# Patient Record
Sex: FEMALE | Race: WHITE | NOT HISPANIC OR LATINO | Employment: UNEMPLOYED | RURAL
[De-identification: names, ages, dates, MRNs, and addresses within clinical notes are randomized per-mention and may not be internally consistent; named-entity substitution may affect disease eponyms.]

---

## 2020-03-10 ENCOUNTER — HISTORICAL (OUTPATIENT)
Dept: ADMINISTRATIVE | Facility: HOSPITAL | Age: 25
End: 2020-03-10

## 2020-03-10 LAB — HCG SERPL-ACNC: <1 MIU/ML

## 2020-08-03 ENCOUNTER — HISTORICAL (OUTPATIENT)
Dept: ADMINISTRATIVE | Facility: HOSPITAL | Age: 25
End: 2020-08-03

## 2020-08-03 LAB — SARS-COV+SARS-COV-2 AG RESP QL IA.RAPID: POSITIVE

## 2020-12-15 ENCOUNTER — HISTORICAL (OUTPATIENT)
Dept: ADMINISTRATIVE | Facility: HOSPITAL | Age: 25
End: 2020-12-15

## 2021-02-17 ENCOUNTER — HISTORICAL (OUTPATIENT)
Dept: ADMINISTRATIVE | Facility: HOSPITAL | Age: 26
End: 2021-02-17

## 2021-02-17 LAB
ABO: NORMAL
ANTIBODY SCREEN: NEGATIVE
RH TYPE: NEGATIVE
UNIT STATUS: 1

## 2021-02-18 ENCOUNTER — HISTORICAL (OUTPATIENT)
Dept: ADMINISTRATIVE | Facility: HOSPITAL | Age: 26
End: 2021-02-18

## 2021-02-18 LAB
ALBUMIN SERPL BCP-MCNC: 2.9 G/DL (ref 3.5–5)
ALBUMIN/GLOB SERPL: 0.7 {RATIO}
ALP SERPL-CCNC: 95 U/L (ref 37–98)
ALT SERPL W P-5'-P-CCNC: 20 U/L (ref 13–56)
ANION GAP SERPL CALCULATED.3IONS-SCNC: 12 MMOL/L
AST SERPL W P-5'-P-CCNC: 19 U/L (ref 15–37)
BASOPHILS # BLD AUTO: 0.03 X10E3/UL (ref 0–0.2)
BASOPHILS NFR BLD AUTO: 0.2 % (ref 0–1)
BILIRUB SERPL-MCNC: 0.8 MG/DL (ref 0–1.2)
BILIRUB UR QL STRIP: NEGATIVE MG/DL
BUN SERPL-MCNC: 8 MG/DL (ref 7–18)
BUN/CREAT SERPL: 19.5
CALCIUM SERPL-MCNC: 8.6 MG/DL (ref 8.5–10.1)
CHLORIDE SERPL-SCNC: 104 MMOL/L (ref 98–107)
CLARITY UR: ABNORMAL
CO2 SERPL-SCNC: 24 MMOL/L (ref 21–32)
COLOR UR: ABNORMAL
CREAT SERPL-MCNC: 0.41 MG/DL (ref 0.55–1.02)
EOSINOPHIL # BLD AUTO: 0.01 X10E3/UL (ref 0–0.5)
EOSINOPHIL NFR BLD AUTO: 0.1 % (ref 1–4)
EOSINOPHIL NFR BLD MANUAL: 1 % (ref 1–4)
ERYTHROCYTE [DISTWIDTH] IN BLOOD BY AUTOMATED COUNT: 12.5 % (ref 11.5–14.5)
GLOBULIN SER-MCNC: 4.1 G/DL (ref 2–4)
GLUCOSE SERPL-MCNC: 82 MG/DL (ref 74–106)
GLUCOSE UR STRIP-MCNC: NEGATIVE MG/DL
HCT VFR BLD AUTO: 33.9 % (ref 38–47)
HGB BLD-MCNC: 10.8 G/DL (ref 12–16)
IMM GRANULOCYTES # BLD AUTO: 0.08 X10E3/UL (ref 0–0.04)
IMM GRANULOCYTES NFR BLD: 0.5 % (ref 0–0.4)
KETONES UR STRIP-SCNC: >=80 MG/DL
LEUKOCYTE ESTERASE UR QL STRIP: NEGATIVE LEU/UL
LYMPHOCYTES # BLD AUTO: 0.61 X10E3/UL (ref 1–4.8)
LYMPHOCYTES NFR BLD AUTO: 4.2 % (ref 27–41)
LYMPHOCYTES NFR BLD MANUAL: 3 % (ref 27–41)
MCH RBC QN AUTO: 29.3 PG (ref 27–31)
MCHC RBC AUTO-ENTMCNC: 31.9 G/DL (ref 32–36)
MCV RBC AUTO: 92.1 FL (ref 80–96)
MONOCYTES # BLD AUTO: 0.34 X10E3/UL (ref 0–0.8)
MONOCYTES NFR BLD AUTO: 2.3 % (ref 2–6)
MONOCYTES NFR BLD MANUAL: 1 % (ref 2–6)
MPC BLD CALC-MCNC: 12.3 FL (ref 9.4–12.4)
NEUTROPHILS # BLD AUTO: 13.49 X10E3/UL (ref 1.8–7.7)
NEUTROPHILS NFR BLD AUTO: 92.7 % (ref 53–65)
NEUTS BAND NFR BLD MANUAL: 4 % (ref 1–5)
NEUTS SEG NFR BLD MANUAL: 91 % (ref 50–62)
NITRITE UR QL STRIP: NEGATIVE
NRBC # BLD AUTO: 0 X10E3/UL (ref 0–0)
NRBC, AUTO (.00): 0 /100 (ref 0–0)
PH UR STRIP: 6.5 PH UNITS (ref 5–8)
PLATELET # BLD AUTO: 180 X10E3/UL (ref 150–400)
PLATELET MORPHOLOGY: ABNORMAL
POLYCHROMASIA BLD QL SMEAR: ABNORMAL
POTASSIUM SERPL-SCNC: 4.3 MMOL/L (ref 3.5–5.1)
PROT SERPL-MCNC: 7 G/DL (ref 6.4–8.2)
PROT UR QL STRIP: NEGATIVE MG/DL
RBC # BLD AUTO: 3.68 X10E6/UL (ref 4.2–5.4)
RBC # UR STRIP: NEGATIVE ERY/UL
SODIUM SERPL-SCNC: 136 MMOL/L (ref 136–145)
SP GR UR STRIP: 1.02 (ref 1–1.03)
UROBILINOGEN UR STRIP-ACNC: 0.2 EU/DL
WBC # BLD AUTO: 14.56 X10E3/UL (ref 4.5–11)

## 2021-03-16 ENCOUNTER — HOSPITAL ENCOUNTER (OUTPATIENT)
Facility: HOSPITAL | Age: 26
Discharge: HOME | End: 2021-03-16
Attending: OBSTETRICS & GYNECOLOGY | Admitting: OBSTETRICS & GYNECOLOGY
Payer: MEDICAID

## 2021-03-16 VITALS
HEART RATE: 102 BPM | HEIGHT: 63 IN | WEIGHT: 148.63 LBS | TEMPERATURE: 99 F | DIASTOLIC BLOOD PRESSURE: 53 MMHG | RESPIRATION RATE: 18 BRPM | OXYGEN SATURATION: 98 % | SYSTOLIC BLOOD PRESSURE: 102 MMHG | BODY MASS INDEX: 26.34 KG/M2

## 2021-03-16 DIAGNOSIS — Z34.90 PREGNANT AND NOT YET DELIVERED: ICD-10-CM

## 2021-03-16 PROCEDURE — 81001 URINALYSIS AUTO W/SCOPE: CPT

## 2021-03-16 PROCEDURE — 63600175 PHARM REV CODE 636 W HCPCS: Performed by: OBSTETRICS & GYNECOLOGY

## 2021-03-16 PROCEDURE — 99211 OFF/OP EST MAY X REQ PHY/QHP: CPT

## 2021-03-16 RX ORDER — ONDANSETRON 2 MG/ML
4 INJECTION INTRAMUSCULAR; INTRAVENOUS EVERY 6 HOURS PRN
Status: DISCONTINUED | OUTPATIENT
Start: 2021-03-16 | End: 2021-03-16 | Stop reason: HOSPADM

## 2021-03-16 RX ADMIN — SODIUM CHLORIDE, POTASSIUM CHLORIDE, SODIUM LACTATE AND CALCIUM CHLORIDE 1000 ML: 600; 310; 30; 20 INJECTION, SOLUTION INTRAVENOUS at 02:03

## 2021-04-27 ENCOUNTER — HOSPITAL ENCOUNTER (OUTPATIENT)
Facility: HOSPITAL | Age: 26
Discharge: HOME | End: 2021-04-27
Attending: OBSTETRICS & GYNECOLOGY | Admitting: OBSTETRICS & GYNECOLOGY
Payer: MEDICAID

## 2021-04-27 VITALS
BODY MASS INDEX: 27.89 KG/M2 | SYSTOLIC BLOOD PRESSURE: 101 MMHG | WEIGHT: 157.38 LBS | HEIGHT: 63 IN | RESPIRATION RATE: 20 BRPM | DIASTOLIC BLOOD PRESSURE: 55 MMHG | TEMPERATURE: 99 F | HEART RATE: 108 BPM

## 2021-04-27 DIAGNOSIS — Z34.90 PREGNANT AND NOT YET DELIVERED: ICD-10-CM

## 2021-04-27 LAB
BILIRUB UR QL STRIP: NEGATIVE
CLARITY UR: ABNORMAL
COLOR UR: YELLOW
GLUCOSE UR STRIP-MCNC: NEGATIVE MG/DL
KETONES UR STRIP-SCNC: NEGATIVE MG/DL
LEUKOCYTE ESTERASE UR QL STRIP: NEGATIVE
NITRITE UR QL STRIP: NEGATIVE
PH UR STRIP: 6.5 PH UNITS
PROT UR QL STRIP: ABNORMAL
RBC # UR STRIP: NEGATIVE /UL
SP GR UR STRIP: >=1.03
UROBILINOGEN UR STRIP-ACNC: 2 MG/DL

## 2021-04-27 PROCEDURE — 99211 OFF/OP EST MAY X REQ PHY/QHP: CPT

## 2021-04-27 PROCEDURE — 81003 URINALYSIS AUTO W/O SCOPE: CPT | Performed by: ADVANCED PRACTICE MIDWIFE

## 2021-05-03 ENCOUNTER — HOSPITAL ENCOUNTER (INPATIENT)
Facility: HOSPITAL | Age: 26
LOS: 2 days | Discharge: HOME | End: 2021-05-05
Attending: OBSTETRICS & GYNECOLOGY | Admitting: OBSTETRICS & GYNECOLOGY
Payer: MEDICAID

## 2021-05-03 ENCOUNTER — ANESTHESIA (OUTPATIENT)
Dept: OBSTETRICS AND GYNECOLOGY | Facility: HOSPITAL | Age: 26
End: 2021-05-03
Payer: MEDICAID

## 2021-05-03 ENCOUNTER — ANESTHESIA EVENT (OUTPATIENT)
Dept: OBSTETRICS AND GYNECOLOGY | Facility: HOSPITAL | Age: 26
End: 2021-05-03
Payer: MEDICAID

## 2021-05-03 DIAGNOSIS — Z34.90 PREGNANCY: ICD-10-CM

## 2021-05-03 DIAGNOSIS — Z34.90 ENCOUNTER FOR ELECTIVE INDUCTION OF LABOR: ICD-10-CM

## 2021-05-03 PROBLEM — O26.899 RH NEGATIVE STATE IN ANTEPARTUM PERIOD: Status: ACTIVE | Noted: 2021-05-03

## 2021-05-03 PROBLEM — Z67.91 RH NEGATIVE STATE IN ANTEPARTUM PERIOD: Status: ACTIVE | Noted: 2021-05-03

## 2021-05-03 PROBLEM — Z3A.39 39 WEEKS GESTATION OF PREGNANCY: Status: ACTIVE | Noted: 2021-05-03

## 2021-05-03 LAB
ALBUMIN SERPL BCP-MCNC: 2.5 G/DL (ref 3.5–5)
ALBUMIN/GLOB SERPL: 0.6 {RATIO}
ALP SERPL-CCNC: 158 U/L (ref 37–98)
ALT SERPL W P-5'-P-CCNC: 21 U/L (ref 13–56)
ANION GAP SERPL CALCULATED.3IONS-SCNC: 15 MMOL/L (ref 7–16)
ANTIBODY IDENTIFICATION: NORMAL
AST SERPL W P-5'-P-CCNC: 20 U/L (ref 15–37)
BASOPHILS # BLD AUTO: 0.02 K/UL (ref 0–0.2)
BASOPHILS NFR BLD AUTO: 0.2 % (ref 0–1)
BILIRUB SERPL-MCNC: 0.3 MG/DL (ref 0–1.2)
BILIRUB UR QL STRIP: NEGATIVE
BUN SERPL-MCNC: 9 MG/DL (ref 7–18)
BUN/CREAT SERPL: 18 (ref 6–20)
CALCIUM SERPL-MCNC: 8.5 MG/DL (ref 8.5–10.1)
CHLORIDE SERPL-SCNC: 105 MMOL/L (ref 98–107)
CLARITY UR: ABNORMAL
CO2 SERPL-SCNC: 23 MMOL/L (ref 21–32)
COLOR UR: YELLOW
CREAT SERPL-MCNC: 0.5 MG/DL (ref 0.55–1.02)
DIFFERENTIAL METHOD BLD: ABNORMAL
EOSINOPHIL # BLD AUTO: 0.05 K/UL (ref 0–0.5)
EOSINOPHIL NFR BLD AUTO: 0.5 % (ref 1–4)
ERYTHROCYTE [DISTWIDTH] IN BLOOD BY AUTOMATED COUNT: 13.2 % (ref 11.5–14.5)
GLOBULIN SER-MCNC: 4 G/DL (ref 2–4)
GLUCOSE SERPL-MCNC: 105 MG/DL (ref 74–106)
GLUCOSE UR STRIP-MCNC: NEGATIVE MG/DL
HBV SURFACE AG SERPL QL IA: NORMAL
HCT VFR BLD AUTO: 29.2 % (ref 38–47)
HGB BLD-MCNC: 9.2 G/DL (ref 12–16)
HIV 1+O+2 AB SERPL QL: NORMAL
IMM GRANULOCYTES # BLD AUTO: 0.13 K/UL (ref 0–0.04)
IMM GRANULOCYTES NFR BLD: 1.3 % (ref 0–0.4)
INDIRECT COOMBS: NORMAL
KETONES UR STRIP-SCNC: ABNORMAL MG/DL
LEUKOCYTE ESTERASE UR QL STRIP: NEGATIVE
LYMPHOCYTES # BLD AUTO: 1.54 K/UL (ref 1–4.8)
LYMPHOCYTES NFR BLD AUTO: 15 % (ref 27–41)
MCH RBC QN AUTO: 27.9 PG (ref 27–31)
MCHC RBC AUTO-ENTMCNC: 31.5 G/DL (ref 32–36)
MCV RBC AUTO: 88.5 FL (ref 80–96)
MONOCYTES # BLD AUTO: 0.67 K/UL (ref 0–0.8)
MONOCYTES NFR BLD AUTO: 6.5 % (ref 2–6)
MPC BLD CALC-MCNC: 11.8 FL (ref 9.4–12.4)
NEUTROPHILS # BLD AUTO: 7.89 K/UL (ref 1.8–7.7)
NEUTROPHILS NFR BLD AUTO: 76.5 % (ref 53–65)
NITRITE UR QL STRIP: NEGATIVE
NRBC # BLD AUTO: 0 X10E3/UL
NRBC, AUTO (.00): 0 %
PH UR STRIP: 6.5 PH UNITS
PLATELET # BLD AUTO: 207 K/UL (ref 150–400)
POTASSIUM SERPL-SCNC: 3.9 MMOL/L (ref 3.5–5.1)
PROT SERPL-MCNC: 6.5 G/DL (ref 6.4–8.2)
PROT UR QL STRIP: NEGATIVE
RBC # BLD AUTO: 3.3 M/UL (ref 4.2–5.4)
RBC # UR STRIP: NEGATIVE /UL
RH BLD: NORMAL
ROSETTE - FMH (FETAL BLEED SCREEN): NORMAL
SODIUM SERPL-SCNC: 139 MMOL/L (ref 136–145)
SP GR UR STRIP: 1.02
SYPHILIS AB INTERPRETATION: NORMAL
UROBILINOGEN UR STRIP-ACNC: 4 MG/DL
WBC # BLD AUTO: 10.3 K/UL (ref 4.5–11)

## 2021-05-03 PROCEDURE — 25000003 PHARM REV CODE 250: Performed by: ANESTHESIOLOGY

## 2021-05-03 PROCEDURE — 85025 COMPLETE CBC W/AUTO DIFF WBC: CPT | Performed by: ADVANCED PRACTICE MIDWIFE

## 2021-05-03 PROCEDURE — 88307 SURGICAL PATHOLOGY: ICD-10-PCS | Mod: 26,,, | Performed by: PATHOLOGY

## 2021-05-03 PROCEDURE — 72200005 HC VAGINAL DELIVERY LEVEL II

## 2021-05-03 PROCEDURE — C1751 CATH, INF, PER/CENT/MIDLINE: HCPCS | Performed by: ANESTHESIOLOGY

## 2021-05-03 PROCEDURE — 86870 RBC ANTIBODY IDENTIFICATION: CPT | Performed by: OBSTETRICS & GYNECOLOGY

## 2021-05-03 PROCEDURE — 87340 HEPATITIS B SURFACE AG IA: CPT | Performed by: ADVANCED PRACTICE MIDWIFE

## 2021-05-03 PROCEDURE — 86780 TREPONEMA PALLIDUM: CPT | Performed by: ADVANCED PRACTICE MIDWIFE

## 2021-05-03 PROCEDURE — 81003 URINALYSIS AUTO W/O SCOPE: CPT | Performed by: ADVANCED PRACTICE MIDWIFE

## 2021-05-03 PROCEDURE — 63600175 PHARM REV CODE 636 W HCPCS: Performed by: ANESTHESIOLOGY

## 2021-05-03 PROCEDURE — 88307 TISSUE EXAM BY PATHOLOGIST: CPT | Mod: SUR | Performed by: ADVANCED PRACTICE MIDWIFE

## 2021-05-03 PROCEDURE — 72100003 HC LABOR CARE, EA. ADDL. 8 HRS

## 2021-05-03 PROCEDURE — 85461 HEMOGLOBIN FETAL: CPT | Performed by: ADVANCED PRACTICE MIDWIFE

## 2021-05-03 PROCEDURE — 36415 COLL VENOUS BLD VENIPUNCTURE: CPT | Performed by: ADVANCED PRACTICE MIDWIFE

## 2021-05-03 PROCEDURE — 72100002 HC LABOR CARE, 1ST 8 HOURS

## 2021-05-03 PROCEDURE — 59410 OBSTETRICAL CARE: CPT | Mod: AA,,, | Performed by: ANESTHESIOLOGY

## 2021-05-03 PROCEDURE — 80053 COMPREHEN METABOLIC PANEL: CPT | Performed by: ADVANCED PRACTICE MIDWIFE

## 2021-05-03 PROCEDURE — 86703 HIV-1/HIV-2 1 RESULT ANTBDY: CPT | Performed by: ADVANCED PRACTICE MIDWIFE

## 2021-05-03 PROCEDURE — 63600519 RHOGAM PHARM REV CODE 636 ALT 250 W HCPCS: Performed by: OBSTETRICS & GYNECOLOGY

## 2021-05-03 PROCEDURE — 86900 BLOOD TYPING SEROLOGIC ABO: CPT | Performed by: OBSTETRICS & GYNECOLOGY

## 2021-05-03 PROCEDURE — 27000727 HC TRAY FOLEY W-METER 16-18FR

## 2021-05-03 PROCEDURE — 25000003 PHARM REV CODE 250: Performed by: ADVANCED PRACTICE MIDWIFE

## 2021-05-03 PROCEDURE — 11000001 HC ACUTE MED/SURG PRIVATE ROOM

## 2021-05-03 PROCEDURE — 88307 TISSUE EXAM BY PATHOLOGIST: CPT | Mod: 26,,, | Performed by: PATHOLOGY

## 2021-05-03 PROCEDURE — 62326 NJX INTERLAMINAR LMBR/SAC: CPT | Performed by: ANESTHESIOLOGY

## 2021-05-03 PROCEDURE — 59410 PRA OBSTE CARE,VAG DELIV+POSTPARTUM: ICD-10-PCS | Mod: AA,,, | Performed by: ANESTHESIOLOGY

## 2021-05-03 PROCEDURE — 63600175 PHARM REV CODE 636 W HCPCS: Performed by: ADVANCED PRACTICE MIDWIFE

## 2021-05-03 RX ORDER — SODIUM CITRATE AND CITRIC ACID MONOHYDRATE 334; 500 MG/5ML; MG/5ML
30 SOLUTION ORAL ONCE AS NEEDED
Status: DISCONTINUED | OUTPATIENT
Start: 2021-05-03 | End: 2021-05-03

## 2021-05-03 RX ORDER — SIMETHICONE 80 MG
1 TABLET,CHEWABLE ORAL EVERY 6 HOURS PRN
Status: DISCONTINUED | OUTPATIENT
Start: 2021-05-03 | End: 2021-05-06 | Stop reason: HOSPADM

## 2021-05-03 RX ORDER — EPHEDRINE SULFATE 50 MG/ML
10 INJECTION, SOLUTION INTRAVENOUS ONCE
Status: DISCONTINUED | OUTPATIENT
Start: 2021-05-03 | End: 2021-05-03

## 2021-05-03 RX ORDER — LIDOCAINE HYDROCHLORIDE 20 MG/ML
INJECTION, SOLUTION EPIDURAL; INFILTRATION; INTRACAUDAL; PERINEURAL
Status: COMPLETED | OUTPATIENT
Start: 2021-05-03 | End: 2021-05-03

## 2021-05-03 RX ORDER — DIPHENOXYLATE HYDROCHLORIDE AND ATROPINE SULFATE 2.5; .025 MG/1; MG/1
1 TABLET ORAL 4 TIMES DAILY PRN
Status: DISCONTINUED | OUTPATIENT
Start: 2021-05-03 | End: 2021-05-03

## 2021-05-03 RX ORDER — FENTANYL/ROPIVACAINE/NS/PF 2MCG/ML-.2
10 PLASTIC BAG, INJECTION (ML) INJECTION CONTINUOUS
Status: DISCONTINUED | OUTPATIENT
Start: 2021-05-03 | End: 2021-05-03

## 2021-05-03 RX ORDER — PROCHLORPERAZINE EDISYLATE 5 MG/ML
5 INJECTION INTRAMUSCULAR; INTRAVENOUS EVERY 6 HOURS PRN
Status: DISCONTINUED | OUTPATIENT
Start: 2021-05-03 | End: 2021-05-03

## 2021-05-03 RX ORDER — IBUPROFEN 600 MG/1
600 TABLET ORAL EVERY 6 HOURS PRN
Status: DISCONTINUED | OUTPATIENT
Start: 2021-05-03 | End: 2021-05-06 | Stop reason: HOSPADM

## 2021-05-03 RX ORDER — CARBOPROST TROMETHAMINE 250 UG/ML
250 INJECTION, SOLUTION INTRAMUSCULAR
Status: DISCONTINUED | OUTPATIENT
Start: 2021-05-03 | End: 2021-05-03

## 2021-05-03 RX ORDER — METHYLERGONOVINE MALEATE 0.2 MG/ML
200 INJECTION INTRAVENOUS
Status: DISCONTINUED | OUTPATIENT
Start: 2021-05-03 | End: 2021-05-03

## 2021-05-03 RX ORDER — LIDOCAINE HYDROCHLORIDE 20 MG/ML
10 INJECTION, SOLUTION EPIDURAL; INFILTRATION; INTRACAUDAL; PERINEURAL ONCE
Status: DISCONTINUED | OUTPATIENT
Start: 2021-05-03 | End: 2021-05-03

## 2021-05-03 RX ORDER — OXYTOCIN/RINGER'S LACTATE 30/500 ML
0-30 PLASTIC BAG, INJECTION (ML) INTRAVENOUS CONTINUOUS
Status: DISCONTINUED | OUTPATIENT
Start: 2021-05-03 | End: 2021-05-03

## 2021-05-03 RX ORDER — BUTORPHANOL TARTRATE 1 MG/ML
2 INJECTION INTRAMUSCULAR; INTRAVENOUS
Status: DISCONTINUED | OUTPATIENT
Start: 2021-05-03 | End: 2021-05-03

## 2021-05-03 RX ORDER — ACETAMINOPHEN 325 MG/1
650 TABLET ORAL EVERY 6 HOURS PRN
Status: DISCONTINUED | OUTPATIENT
Start: 2021-05-03 | End: 2021-05-06 | Stop reason: HOSPADM

## 2021-05-03 RX ORDER — LIDOCAINE HYDROCHLORIDE 20 MG/ML
INJECTION, SOLUTION EPIDURAL; INFILTRATION; INTRACAUDAL; PERINEURAL
Status: DISCONTINUED
Start: 2021-05-03 | End: 2021-05-03 | Stop reason: WASHOUT

## 2021-05-03 RX ORDER — BUTORPHANOL TARTRATE 1 MG/ML
1 INJECTION INTRAMUSCULAR; INTRAVENOUS
Status: DISCONTINUED | OUTPATIENT
Start: 2021-05-03 | End: 2021-05-03

## 2021-05-03 RX ORDER — SODIUM CHLORIDE, SODIUM LACTATE, POTASSIUM CHLORIDE, CALCIUM CHLORIDE 600; 310; 30; 20 MG/100ML; MG/100ML; MG/100ML; MG/100ML
INJECTION, SOLUTION INTRAVENOUS CONTINUOUS
Status: DISCONTINUED | OUTPATIENT
Start: 2021-05-03 | End: 2021-05-03

## 2021-05-03 RX ORDER — DOCUSATE SODIUM 100 MG/1
200 CAPSULE, LIQUID FILLED ORAL 2 TIMES DAILY PRN
Status: DISCONTINUED | OUTPATIENT
Start: 2021-05-03 | End: 2021-05-06 | Stop reason: HOSPADM

## 2021-05-03 RX ORDER — ONDANSETRON 2 MG/ML
4 INJECTION INTRAMUSCULAR; INTRAVENOUS EVERY 6 HOURS PRN
Status: DISCONTINUED | OUTPATIENT
Start: 2021-05-03 | End: 2021-05-06 | Stop reason: HOSPADM

## 2021-05-03 RX ORDER — MUPIROCIN 20 MG/G
OINTMENT TOPICAL
Status: DISCONTINUED | OUTPATIENT
Start: 2021-05-03 | End: 2021-05-03

## 2021-05-03 RX ORDER — DIPHENHYDRAMINE HYDROCHLORIDE 50 MG/ML
25 INJECTION INTRAMUSCULAR; INTRAVENOUS EVERY 4 HOURS PRN
Status: DISCONTINUED | OUTPATIENT
Start: 2021-05-03 | End: 2021-05-03

## 2021-05-03 RX ORDER — PRENATAL WITH FERROUS FUM AND FOLIC ACID 3080; 920; 120; 400; 22; 1.84; 3; 20; 10; 1; 12; 200; 27; 25; 2 [IU]/1; [IU]/1; MG/1; [IU]/1; MG/1; MG/1; MG/1; MG/1; MG/1; MG/1; UG/1; MG/1; MG/1; MG/1; MG/1
1 TABLET ORAL DAILY
Status: DISCONTINUED | OUTPATIENT
Start: 2021-05-04 | End: 2021-05-06 | Stop reason: HOSPADM

## 2021-05-03 RX ORDER — OXYTOCIN/RINGER'S LACTATE 30/500 ML
95 PLASTIC BAG, INJECTION (ML) INTRAVENOUS ONCE
Status: COMPLETED | OUTPATIENT
Start: 2021-05-03 | End: 2021-05-03

## 2021-05-03 RX ORDER — ONDANSETRON 4 MG/1
8 TABLET, ORALLY DISINTEGRATING ORAL EVERY 8 HOURS PRN
Status: DISCONTINUED | OUTPATIENT
Start: 2021-05-03 | End: 2021-05-03

## 2021-05-03 RX ORDER — LIDOCAINE HYDROCHLORIDE 20 MG/ML
10 INJECTION, SOLUTION EPIDURAL; INFILTRATION; INTRACAUDAL; PERINEURAL ONCE
Status: COMPLETED | OUTPATIENT
Start: 2021-05-03 | End: 2021-05-03

## 2021-05-03 RX ORDER — EPHEDRINE SULFATE 50 MG/ML
10 INJECTION, SOLUTION INTRAVENOUS ONCE AS NEEDED
Status: DISCONTINUED | OUTPATIENT
Start: 2021-05-03 | End: 2021-05-03

## 2021-05-03 RX ORDER — ACETAMINOPHEN AND CODEINE PHOSPHATE 300; 30 MG/1; MG/1
1 TABLET ORAL EVERY 4 HOURS PRN
Status: DISCONTINUED | OUTPATIENT
Start: 2021-05-03 | End: 2021-05-06 | Stop reason: HOSPADM

## 2021-05-03 RX ORDER — FAMOTIDINE 10 MG/ML
20 INJECTION INTRAVENOUS ONCE AS NEEDED
Status: DISCONTINUED | OUTPATIENT
Start: 2021-05-03 | End: 2021-05-03

## 2021-05-03 RX ORDER — DIPHENHYDRAMINE HCL 25 MG
25 CAPSULE ORAL EVERY 4 HOURS PRN
Status: DISCONTINUED | OUTPATIENT
Start: 2021-05-03 | End: 2021-05-03

## 2021-05-03 RX ORDER — CALCIUM CARBONATE 200(500)MG
500 TABLET,CHEWABLE ORAL 3 TIMES DAILY PRN
Status: DISCONTINUED | OUTPATIENT
Start: 2021-05-03 | End: 2021-05-03

## 2021-05-03 RX ORDER — MISOPROSTOL 200 UG/1
800 TABLET ORAL
Status: DISCONTINUED | OUTPATIENT
Start: 2021-05-03 | End: 2021-05-03

## 2021-05-03 RX ORDER — SODIUM CHLORIDE 0.9 % (FLUSH) 0.9 %
10 SYRINGE (ML) INJECTION
Status: DISCONTINUED | OUTPATIENT
Start: 2021-05-03 | End: 2021-05-03

## 2021-05-03 RX ORDER — HYDROCORTISONE 25 MG/G
CREAM TOPICAL 3 TIMES DAILY PRN
Status: DISCONTINUED | OUTPATIENT
Start: 2021-05-03 | End: 2021-05-06 | Stop reason: HOSPADM

## 2021-05-03 RX ORDER — SODIUM CHLORIDE 9 MG/ML
INJECTION, SOLUTION INTRAVENOUS
Status: DISCONTINUED | OUTPATIENT
Start: 2021-05-03 | End: 2021-05-03

## 2021-05-03 RX ORDER — KETOROLAC TROMETHAMINE 30 MG/ML
30 INJECTION, SOLUTION INTRAMUSCULAR; INTRAVENOUS EVERY 6 HOURS
Status: CANCELLED | OUTPATIENT
Start: 2021-05-03 | End: 2021-05-04

## 2021-05-03 RX ORDER — LIDOCAINE HYDROCHLORIDE 20 MG/ML
INJECTION, SOLUTION EPIDURAL; INFILTRATION; INTRACAUDAL; PERINEURAL
Status: DISPENSED
Start: 2021-05-03 | End: 2021-05-04

## 2021-05-03 RX ORDER — SIMETHICONE 80 MG
1 TABLET,CHEWABLE ORAL 4 TIMES DAILY PRN
Status: DISCONTINUED | OUTPATIENT
Start: 2021-05-03 | End: 2021-05-03

## 2021-05-03 RX ADMIN — SODIUM CHLORIDE, POTASSIUM CHLORIDE, SODIUM LACTATE AND CALCIUM CHLORIDE: 600; 310; 30; 20 INJECTION, SOLUTION INTRAVENOUS at 06:05

## 2021-05-03 RX ADMIN — ACETAMINOPHEN AND CODEINE PHOSPHATE 1 TABLET: 300; 30 TABLET ORAL at 04:05

## 2021-05-03 RX ADMIN — ROPIVACAINE HYDROCHLORIDE 10 ML/HR: 10 INJECTION, SOLUTION EPIDURAL at 11:05

## 2021-05-03 RX ADMIN — LIDOCAINE HYDROCHLORIDE 200 MG: 20 INJECTION, SOLUTION EPIDURAL; INFILTRATION; INTRACAUDAL; PERINEURAL at 11:05

## 2021-05-03 RX ADMIN — ONDANSETRON 4 MG: 2 INJECTION INTRAMUSCULAR; INTRAVENOUS at 11:05

## 2021-05-03 RX ADMIN — SODIUM CHLORIDE, POTASSIUM CHLORIDE, SODIUM LACTATE AND CALCIUM CHLORIDE: 600; 310; 30; 20 INJECTION, SOLUTION INTRAVENOUS at 08:05

## 2021-05-03 RX ADMIN — HUMAN RHO(D) IMMUNE GLOBULIN 300 MCG: 1500 SOLUTION INTRAMUSCULAR; INTRAVENOUS at 09:05

## 2021-05-03 RX ADMIN — Medication 2 MILLI-UNITS/MIN: at 06:05

## 2021-05-03 RX ADMIN — LIDOCAINE HYDROCHLORIDE 10 ML: 20 INJECTION, SOLUTION INTRAVENOUS at 11:05

## 2021-05-03 RX ADMIN — SODIUM CHLORIDE, POTASSIUM CHLORIDE, SODIUM LACTATE AND CALCIUM CHLORIDE: 600; 310; 30; 20 INJECTION, SOLUTION INTRAVENOUS at 11:05

## 2021-05-03 RX ADMIN — Medication 95 MILLI-UNITS/MIN: at 03:05

## 2021-05-04 PROBLEM — Z3A.39 39 WEEKS GESTATION OF PREGNANCY: Status: RESOLVED | Noted: 2021-05-03 | Resolved: 2021-05-04

## 2021-05-04 PROBLEM — Z34.90 ENCOUNTER FOR ELECTIVE INDUCTION OF LABOR: Status: RESOLVED | Noted: 2021-05-03 | Resolved: 2021-05-04

## 2021-05-04 LAB
BASOPHILS # BLD AUTO: 0.02 K/UL (ref 0–0.2)
BASOPHILS NFR BLD AUTO: 0.1 % (ref 0–1)
DIFFERENTIAL METHOD BLD: ABNORMAL
EOSINOPHIL # BLD AUTO: 0.02 K/UL (ref 0–0.5)
EOSINOPHIL NFR BLD AUTO: 0.1 % (ref 1–4)
ERYTHROCYTE [DISTWIDTH] IN BLOOD BY AUTOMATED COUNT: 12.9 % (ref 11.5–14.5)
HCT VFR BLD AUTO: 32.3 % (ref 38–47)
HGB BLD-MCNC: 10.2 G/DL (ref 12–16)
IMM GRANULOCYTES # BLD AUTO: 0.11 K/UL (ref 0–0.04)
IMM GRANULOCYTES NFR BLD: 0.7 % (ref 0–0.4)
LYMPHOCYTES # BLD AUTO: 1.7 K/UL (ref 1–4.8)
LYMPHOCYTES NFR BLD AUTO: 10.7 % (ref 27–41)
MCH RBC QN AUTO: 27.3 PG (ref 27–31)
MCHC RBC AUTO-ENTMCNC: 31.6 G/DL (ref 32–36)
MCV RBC AUTO: 86.4 FL (ref 80–96)
MONOCYTES # BLD AUTO: 0.78 K/UL (ref 0–0.8)
MONOCYTES NFR BLD AUTO: 4.9 % (ref 2–6)
MPC BLD CALC-MCNC: 11.8 FL (ref 9.4–12.4)
NEUTROPHILS # BLD AUTO: 13.2 K/UL (ref 1.8–7.7)
NEUTROPHILS NFR BLD AUTO: 83.5 % (ref 53–65)
NRBC # BLD AUTO: 0 X10E3/UL
NRBC, AUTO (.00): 0 %
PLATELET # BLD AUTO: 227 K/UL (ref 150–400)
RBC # BLD AUTO: 3.74 M/UL (ref 4.2–5.4)
WBC # BLD AUTO: 15.83 K/UL (ref 4.5–11)

## 2021-05-04 PROCEDURE — 36415 COLL VENOUS BLD VENIPUNCTURE: CPT | Performed by: ADVANCED PRACTICE MIDWIFE

## 2021-05-04 PROCEDURE — 85025 COMPLETE CBC W/AUTO DIFF WBC: CPT | Performed by: ADVANCED PRACTICE MIDWIFE

## 2021-05-04 PROCEDURE — 63600175 PHARM REV CODE 636 W HCPCS: Performed by: ANESTHESIOLOGY

## 2021-05-04 PROCEDURE — 25000003 PHARM REV CODE 250: Performed by: ANESTHESIOLOGY

## 2021-05-04 PROCEDURE — 25000003 PHARM REV CODE 250: Performed by: ADVANCED PRACTICE MIDWIFE

## 2021-05-04 PROCEDURE — 11000001 HC ACUTE MED/SURG PRIVATE ROOM

## 2021-05-04 RX ORDER — ONDANSETRON 2 MG/ML
4 INJECTION INTRAMUSCULAR; INTRAVENOUS EVERY 6 HOURS PRN
Status: ACTIVE | OUTPATIENT
Start: 2021-05-04 | End: 2021-05-05

## 2021-05-04 RX ORDER — OXYCODONE HYDROCHLORIDE 5 MG/1
10 TABLET ORAL EVERY 4 HOURS PRN
Status: ACTIVE | OUTPATIENT
Start: 2021-05-04 | End: 2021-05-05

## 2021-05-04 RX ORDER — PROCHLORPERAZINE EDISYLATE 5 MG/ML
5 INJECTION INTRAMUSCULAR; INTRAVENOUS EVERY 6 HOURS PRN
Status: DISCONTINUED | OUTPATIENT
Start: 2021-05-04 | End: 2021-05-06 | Stop reason: HOSPADM

## 2021-05-04 RX ORDER — KETOROLAC TROMETHAMINE 30 MG/ML
30 INJECTION, SOLUTION INTRAMUSCULAR; INTRAVENOUS EVERY 6 HOURS
Status: DISCONTINUED | OUTPATIENT
Start: 2021-05-04 | End: 2021-05-05 | Stop reason: HOSPADM

## 2021-05-04 RX ORDER — OXYCODONE HYDROCHLORIDE 5 MG/1
5 TABLET ORAL EVERY 4 HOURS PRN
Status: ACTIVE | OUTPATIENT
Start: 2021-05-04 | End: 2021-05-05

## 2021-05-04 RX ORDER — BUTALBITAL, ACETAMINOPHEN AND CAFFEINE 50; 325; 40 MG/1; MG/1; MG/1
1 TABLET ORAL EVERY 4 HOURS PRN
Status: DISCONTINUED | OUTPATIENT
Start: 2021-05-04 | End: 2021-05-06 | Stop reason: HOSPADM

## 2021-05-04 RX ORDER — ONDANSETRON 4 MG/1
4 TABLET, ORALLY DISINTEGRATING ORAL ONCE
Status: COMPLETED | OUTPATIENT
Start: 2021-05-04 | End: 2021-05-04

## 2021-05-04 RX ADMIN — KETOROLAC TROMETHAMINE 30 MG: 60 INJECTION, SOLUTION INTRAMUSCULAR at 09:05

## 2021-05-04 RX ADMIN — ONDANSETRON 4 MG: 4 TABLET, ORALLY DISINTEGRATING ORAL at 01:05

## 2021-05-04 RX ADMIN — Medication 1 TABLET: at 09:05

## 2021-05-04 RX ADMIN — KETOROLAC TROMETHAMINE 30 MG: 60 INJECTION, SOLUTION INTRAMUSCULAR at 03:05

## 2021-05-04 RX ADMIN — BUTALBITAL, ACETAMINOPHEN AND CAFFEINE 1 TABLET: 50; 325; 40 TABLET ORAL at 09:05

## 2021-05-04 RX ADMIN — BUTALBITAL, ACETAMINOPHEN AND CAFFEINE 1 TABLET: 50; 325; 40 TABLET ORAL at 03:05

## 2021-05-04 RX ADMIN — IBUPROFEN 600 MG: 600 TABLET, FILM COATED ORAL at 11:05

## 2021-05-04 RX ADMIN — ACETAMINOPHEN AND CODEINE PHOSPHATE 1 TABLET: 300; 30 TABLET ORAL at 11:05

## 2021-05-05 ENCOUNTER — ANESTHESIA EVENT (OUTPATIENT)
Dept: OBSTETRICS AND GYNECOLOGY | Facility: HOSPITAL | Age: 26
End: 2021-05-05

## 2021-05-05 ENCOUNTER — ANESTHESIA EVENT (OUTPATIENT)
Dept: OBSTETRICS AND GYNECOLOGY | Facility: HOSPITAL | Age: 26
End: 2021-05-05
Payer: MEDICAID

## 2021-05-05 ENCOUNTER — ANESTHESIA (OUTPATIENT)
Dept: OBSTETRICS AND GYNECOLOGY | Facility: HOSPITAL | Age: 26
End: 2021-05-05

## 2021-05-05 ENCOUNTER — ANESTHESIA (OUTPATIENT)
Dept: OBSTETRICS AND GYNECOLOGY | Facility: HOSPITAL | Age: 26
End: 2021-05-05
Payer: MEDICAID

## 2021-05-05 VITALS
HEART RATE: 66 BPM | BODY MASS INDEX: 27.36 KG/M2 | OXYGEN SATURATION: 97 % | RESPIRATION RATE: 18 BRPM | HEIGHT: 63 IN | WEIGHT: 154.38 LBS | TEMPERATURE: 98 F | SYSTOLIC BLOOD PRESSURE: 116 MMHG | DIASTOLIC BLOOD PRESSURE: 71 MMHG

## 2021-05-05 LAB
ESTROGEN SERPL-MCNC: NORMAL PG/ML
LAB AP CLINICAL INFORMATION: NORMAL
LAB AP GROSS DESCRIPTION: NORMAL
LAB AP LABORATORY NOTES: NORMAL
T3RU NFR SERPL: NORMAL %

## 2021-05-05 PROCEDURE — 11000001 HC ACUTE MED/SURG PRIVATE ROOM

## 2021-05-05 PROCEDURE — 62273 EPIDURAL BLOOD PATCH: ICD-10-PCS | Mod: ,,, | Performed by: ANESTHESIOLOGY

## 2021-05-05 PROCEDURE — 25000003 PHARM REV CODE 250: Performed by: ADVANCED PRACTICE MIDWIFE

## 2021-05-05 PROCEDURE — 62273 INJECT EPIDURAL PATCH: CPT | Mod: ,,, | Performed by: ANESTHESIOLOGY

## 2021-05-05 PROCEDURE — 25000003 PHARM REV CODE 250: Performed by: ANESTHESIOLOGY

## 2021-05-05 PROCEDURE — 63600175 PHARM REV CODE 636 W HCPCS: Performed by: ANESTHESIOLOGY

## 2021-05-05 RX ORDER — IBUPROFEN 600 MG/1
600 TABLET ORAL EVERY 6 HOURS PRN
Qty: 30 TABLET | Refills: 1 | Status: SHIPPED | OUTPATIENT
Start: 2021-05-05 | End: 2023-01-10

## 2021-05-05 RX ORDER — SODIUM CHLORIDE, SODIUM LACTATE, POTASSIUM CHLORIDE, CALCIUM CHLORIDE 600; 310; 30; 20 MG/100ML; MG/100ML; MG/100ML; MG/100ML
INJECTION, SOLUTION INTRAVENOUS CONTINUOUS
Status: DISCONTINUED | OUTPATIENT
Start: 2021-05-05 | End: 2021-05-06 | Stop reason: HOSPADM

## 2021-05-05 RX ORDER — PRENATAL WITH FERROUS FUM AND FOLIC ACID 3080; 920; 120; 400; 22; 1.84; 3; 20; 10; 1; 12; 200; 27; 25; 2 [IU]/1; [IU]/1; MG/1; [IU]/1; MG/1; MG/1; MG/1; MG/1; MG/1; MG/1; UG/1; MG/1; MG/1; MG/1; MG/1
1 TABLET ORAL DAILY
Qty: 30 TABLET | Refills: 11 | Status: SHIPPED | OUTPATIENT
Start: 2021-05-05 | End: 2023-01-14 | Stop reason: ALTCHOICE

## 2021-05-05 RX ORDER — BUTALBITAL, ACETAMINOPHEN AND CAFFEINE 50; 325; 40 MG/1; MG/1; MG/1
1 TABLET ORAL EVERY 4 HOURS PRN
Qty: 20 TABLET | Refills: 0 | Status: SHIPPED | OUTPATIENT
Start: 2021-05-05 | End: 2021-06-04

## 2021-05-05 RX ADMIN — Medication 1 TABLET: at 09:05

## 2021-05-05 RX ADMIN — BUTALBITAL, ACETAMINOPHEN AND CAFFEINE 1 TABLET: 50; 325; 40 TABLET ORAL at 10:05

## 2021-05-05 RX ADMIN — KETOROLAC TROMETHAMINE 30 MG: 60 INJECTION, SOLUTION INTRAMUSCULAR at 10:05

## 2021-05-07 ENCOUNTER — ANESTHESIA EVENT (OUTPATIENT)
Dept: EMERGENCY MEDICINE | Facility: HOSPITAL | Age: 26
End: 2021-05-07
Payer: MEDICAID

## 2021-05-07 ENCOUNTER — HOSPITAL ENCOUNTER (EMERGENCY)
Facility: HOSPITAL | Age: 26
Discharge: HOME | End: 2021-05-07
Attending: STUDENT IN AN ORGANIZED HEALTH CARE EDUCATION/TRAINING PROGRAM
Payer: MEDICAID

## 2021-05-07 ENCOUNTER — ANESTHESIA (OUTPATIENT)
Dept: EMERGENCY MEDICINE | Facility: HOSPITAL | Age: 26
End: 2021-05-07
Payer: MEDICAID

## 2021-05-07 VITALS
OXYGEN SATURATION: 100 % | HEIGHT: 63 IN | WEIGHT: 145 LBS | BODY MASS INDEX: 25.69 KG/M2 | DIASTOLIC BLOOD PRESSURE: 78 MMHG | RESPIRATION RATE: 18 BRPM | SYSTOLIC BLOOD PRESSURE: 120 MMHG | HEART RATE: 65 BPM | TEMPERATURE: 98 F

## 2021-05-07 DIAGNOSIS — Z67.91 RH NEGATIVE STATE IN ANTEPARTUM PERIOD: ICD-10-CM

## 2021-05-07 DIAGNOSIS — O26.899 RH NEGATIVE STATE IN ANTEPARTUM PERIOD: ICD-10-CM

## 2021-05-07 DIAGNOSIS — G97.1 SPINAL HEADACHE: Primary | ICD-10-CM

## 2021-05-07 PROCEDURE — 99283 PR EMERGENCY DEPT VISIT,LEVEL III: ICD-10-PCS | Mod: ,,, | Performed by: STUDENT IN AN ORGANIZED HEALTH CARE EDUCATION/TRAINING PROGRAM

## 2021-05-07 PROCEDURE — 99284 EMERGENCY DEPT VISIT MOD MDM: CPT | Mod: 25

## 2021-05-07 PROCEDURE — 62273 EPIDURAL BLOOD PATCH: ICD-10-PCS | Mod: ,,, | Performed by: ANESTHESIOLOGY

## 2021-05-07 PROCEDURE — 99283 EMERGENCY DEPT VISIT LOW MDM: CPT | Mod: ,,, | Performed by: STUDENT IN AN ORGANIZED HEALTH CARE EDUCATION/TRAINING PROGRAM

## 2021-05-07 PROCEDURE — 62273 INJECT EPIDURAL PATCH: CPT | Mod: ,,, | Performed by: ANESTHESIOLOGY

## 2022-12-12 DIAGNOSIS — G43.909 MIGRAINE WITHOUT STATUS MIGRAINOSUS, NOT INTRACTABLE, UNSPECIFIED MIGRAINE TYPE: Primary | ICD-10-CM

## 2023-01-10 ENCOUNTER — OFFICE VISIT (OUTPATIENT)
Dept: NEUROLOGY | Facility: CLINIC | Age: 28
End: 2023-01-10
Payer: MEDICAID

## 2023-01-10 VITALS
SYSTOLIC BLOOD PRESSURE: 124 MMHG | DIASTOLIC BLOOD PRESSURE: 78 MMHG | OXYGEN SATURATION: 97 % | WEIGHT: 155.31 LBS | HEIGHT: 63 IN | BODY MASS INDEX: 27.52 KG/M2 | HEART RATE: 105 BPM

## 2023-01-10 DIAGNOSIS — G43.909 MIGRAINE WITHOUT STATUS MIGRAINOSUS, NOT INTRACTABLE, UNSPECIFIED MIGRAINE TYPE: Primary | ICD-10-CM

## 2023-01-10 PROCEDURE — 3078F DIAST BP <80 MM HG: CPT | Mod: ,,, | Performed by: PSYCHIATRY & NEUROLOGY

## 2023-01-10 PROCEDURE — 3074F SYST BP LT 130 MM HG: CPT | Mod: ,,, | Performed by: PSYCHIATRY & NEUROLOGY

## 2023-01-10 PROCEDURE — 99204 OFFICE O/P NEW MOD 45 MIN: CPT | Mod: S$PBB,,, | Performed by: PSYCHIATRY & NEUROLOGY

## 2023-01-10 PROCEDURE — 3008F PR BODY MASS INDEX (BMI) DOCUMENTED: ICD-10-PCS | Mod: ,,, | Performed by: PSYCHIATRY & NEUROLOGY

## 2023-01-10 PROCEDURE — 3078F PR MOST RECENT DIASTOLIC BLOOD PRESSURE < 80 MM HG: ICD-10-PCS | Mod: ,,, | Performed by: PSYCHIATRY & NEUROLOGY

## 2023-01-10 PROCEDURE — 99214 OFFICE O/P EST MOD 30 MIN: CPT | Mod: PBBFAC | Performed by: PSYCHIATRY & NEUROLOGY

## 2023-01-10 PROCEDURE — 3074F PR MOST RECENT SYSTOLIC BLOOD PRESSURE < 130 MM HG: ICD-10-PCS | Mod: ,,, | Performed by: PSYCHIATRY & NEUROLOGY

## 2023-01-10 PROCEDURE — 99204 PR OFFICE/OUTPT VISIT, NEW, LEVL IV, 45-59 MIN: ICD-10-PCS | Mod: S$PBB,,, | Performed by: PSYCHIATRY & NEUROLOGY

## 2023-01-10 PROCEDURE — 3008F BODY MASS INDEX DOCD: CPT | Mod: ,,, | Performed by: PSYCHIATRY & NEUROLOGY

## 2023-01-10 RX ORDER — RIZATRIPTAN BENZOATE 10 MG/1
10 TABLET ORAL
Qty: 27 TABLET | Refills: 3 | Status: SHIPPED | OUTPATIENT
Start: 2023-01-10 | End: 2023-04-11

## 2023-01-10 RX ORDER — TOPIRAMATE 50 MG/1
50 TABLET, FILM COATED ORAL 2 TIMES DAILY
Qty: 180 TABLET | Refills: 3 | Status: SHIPPED | OUTPATIENT
Start: 2023-01-10 | End: 2023-04-11 | Stop reason: SDUPTHER

## 2023-01-10 RX ORDER — LEVONORGESTREL / ETHINYL ESTRADIOL AND ETHINYL ESTRADIOL 150-30(84)
1 KIT ORAL DAILY
COMMUNITY
End: 2023-03-28

## 2023-01-10 NOTE — PATIENT INSTRUCTIONS
Stop Vaping  Avoid any known triggers   Trial of TOPAMAX 50 mg twice daily   Maxalt 5 mg onset needed  F/u 3 months

## 2023-01-10 NOTE — PROGRESS NOTES
Subjective:       Patient ID: Martha Vazquez is a 27 y.o. female     Chief Complaint:    Chief Complaint   Patient presents with    Migraine        Allergies:  Lortab [hydrocodone-acetaminophen] and Pcn [penicillins]    Current Medications:    Outpatient Encounter Medications as of 1/10/2023   Medication Sig Dispense Refill    L norgest/e.estradioL-e.estrad (CAMRESE) 0.15 mg-30 mcg (84)/10 mcg (7) 3MPk Take 1 tablet by mouth Daily.      PNV,calcium 72/iron/folic acid (PRENATAL VITAMIN) Tab Take 1 tablet by mouth once daily. 30 tablet 11    [DISCONTINUED] ibuprofen (ADVIL,MOTRIN) 600 MG tablet Take 1 tablet (600 mg total) by mouth every 6 (six) hours as needed (cramping). (Patient not taking: Reported on 1/10/2023) 30 tablet 1     No facility-administered encounter medications on file as of 1/10/2023.       History of Present Illness  26 yo WF w/ 5-6 year hx retroorbital dull, throbbing HEADACHE's w/ photo/phonosensitivty, N/V  Freq about 5-6 x weekly and lasts several hours - having greater than 20 migraine days per month  She tried mult OTC meds and an abortive med she cannot name ?   She has taken Imitrex unprescribed which worked fairly well for her   She cannot recall any triggers   Prev saw Dr Seymour who got NL Mri brain and gave her above abortive agent?          Past Medical History:   Diagnosis Date    39 weeks gestation of pregnancy 5/3/2021    Rh negative state in antepartum period 5/3/2021       Past Surgical History:   Procedure Laterality Date    tongue clipped         Social History  Ms. Vazquez  reports that she has never smoked. She has never used smokeless tobacco. She reports that she does not drink alcohol and does not use drugs.    Family History  Ms.'s Vazquez family history includes Hypertension in her father and paternal grandfather; Stroke in her father and paternal grandfather.    Review of Systems  Review of Systems   Neurological:  Positive for headaches.   All other systems reviewed and are  "negative.   Objective:   /78 (BP Location: Right arm, Patient Position: Sitting, BP Method: Large (Manual))   Pulse 105   Ht 5' 3" (1.6 m)   Wt 70.4 kg (155 lb 4.8 oz)   SpO2 97%   BMI 27.51 kg/m²    NEUROLOGICAL EXAMINATION:     MENTAL STATUS   Oriented to person, place, and time.   Level of consciousness: alert  Knowledge: good.     CRANIAL NERVES   Cranial nerves II through XII intact.     MOTOR EXAM     Strength   Strength 5/5 throughout.     GAIT AND COORDINATION     Gait  Gait: normal     Physical Exam  Vitals reviewed.   Constitutional:       Appearance: She is normal weight.   Neurological:      General: No focal deficit present.      Mental Status: She is alert and oriented to person, place, and time. Mental status is at baseline.      Cranial Nerves: Cranial nerves 2-12 are intact.      Motor: Motor strength is normal.      Gait: Gait is intact.        Assessment:     Migraine without status migrainosus, not intractable, unspecified migraine type  -     Ambulatory referral/consult to Neurology         Primary Diagnosis and ICD10  Migraine without status migrainosus, not intractable, unspecified migraine type [G43.909]    Plan:     Patient Instructions   Stop Vaping  Avoid any known triggers   Trial of TOPAMAX 50 mg twice daily   Maxalt 5 mg onset needed  F/u 3 months     Medications Discontinued During This Encounter   Medication Reason    ibuprofen (ADVIL,MOTRIN) 600 MG tablet        Requested Prescriptions      No prescriptions requested or ordered in this encounter       "

## 2023-01-14 ENCOUNTER — HOSPITAL ENCOUNTER (EMERGENCY)
Facility: HOSPITAL | Age: 28
Discharge: HOME | End: 2023-01-14
Attending: PEDIATRICS
Payer: MEDICAID

## 2023-01-14 VITALS
WEIGHT: 142.31 LBS | SYSTOLIC BLOOD PRESSURE: 118 MMHG | OXYGEN SATURATION: 100 % | HEART RATE: 73 BPM | RESPIRATION RATE: 20 BRPM | HEIGHT: 63 IN | DIASTOLIC BLOOD PRESSURE: 78 MMHG | BODY MASS INDEX: 25.21 KG/M2 | TEMPERATURE: 98 F

## 2023-01-14 DIAGNOSIS — T50.905A MEDICATION SIDE EFFECT, INITIAL ENCOUNTER: Primary | ICD-10-CM

## 2023-01-14 PROCEDURE — 99283 PR EMERGENCY DEPT VISIT,LEVEL III: ICD-10-PCS | Mod: ,,, | Performed by: PEDIATRICS

## 2023-01-14 PROCEDURE — 99283 EMERGENCY DEPT VISIT LOW MDM: CPT | Mod: ,,, | Performed by: PEDIATRICS

## 2023-01-14 PROCEDURE — 99281 EMR DPT VST MAYX REQ PHY/QHP: CPT

## 2023-01-14 RX ORDER — ONDANSETRON 4 MG/1
4 TABLET, ORALLY DISINTEGRATING ORAL
COMMUNITY
Start: 2023-01-11 | End: 2023-08-22 | Stop reason: ALTCHOICE

## 2023-01-14 RX ORDER — AZITHROMYCIN 250 MG/1
TABLET, FILM COATED ORAL
COMMUNITY
Start: 2023-01-11 | End: 2023-06-05 | Stop reason: ALTCHOICE

## 2023-01-14 RX ORDER — PROMETHAZINE HYDROCHLORIDE 25 MG/1
TABLET ORAL
COMMUNITY
Start: 2023-01-13 | End: 2023-08-22 | Stop reason: ALTCHOICE

## 2023-01-14 RX ORDER — BUPROPION HYDROCHLORIDE 150 MG/1
150 TABLET ORAL
COMMUNITY
Start: 2023-01-10 | End: 2023-01-14

## 2023-01-14 RX ORDER — DULAGLUTIDE 0.75 MG/.5ML
INJECTION, SOLUTION SUBCUTANEOUS
COMMUNITY
Start: 2023-01-10 | End: 2024-02-05

## 2023-01-14 NOTE — ED TRIAGE NOTES
Pt reports starting trulicity for weight loss on Tuesday and wellbutrin -was seen by [facility] nurse practioner on- on Wednesday seen again dx with strep and received decadron and rocephin- on Friday seen again in office got 1 liter of fluids and steriod again -pt reports taking wellbutrin this am and feeling faint and nausea today

## 2023-01-14 NOTE — ED PROVIDER NOTES
Encounter Date: 1/14/2023       History     Chief Complaint   Patient presents with    Dizziness    Nausea    feeling faint     Patient reports that she was seen her primary care started on Trulicity and Wellbutrin diagnosed with strep given steroid shot and Rocephin.  She returned 2 days later because of not feeling well received IV fluids and steroids.  She went home she continues take her Topamax without problems this morning she took her Wellbutrin 30 minutes or now or had some nausea. Dizziness and  felt faint.  She came to emergency room to be evaluated.  She reports she had not had these symptoms prior to starting Wellbutrin and Trulicity.  The Trulicity was not received again due to the symptoms as they thought it was due to Trulicity.  What she has taken her dose of Wellbutrin this morning as mentioned above.    Review of patient's allergies indicates:   Allergen Reactions    Lortab [hydrocodone-acetaminophen] Nausea And Vomiting    Pcn [penicillins] Rash     Past Medical History:   Diagnosis Date    39 weeks gestation of pregnancy 5/3/2021    Rh negative state in antepartum period 5/3/2021     Past Surgical History:   Procedure Laterality Date    tongue clipped       Family History   Problem Relation Age of Onset    Hypertension Father     Stroke Father     Hypertension Paternal Grandfather     Stroke Paternal Grandfather      Social History     Tobacco Use    Smoking status: Never    Smokeless tobacco: Current   Substance Use Topics    Alcohol use: Never    Drug use: Never     Review of Systems   Gastrointestinal:  Positive for nausea.   Neurological:  Positive for dizziness.   All other systems reviewed and are negative.    Physical Exam     Initial Vitals   BP Pulse Resp Temp SpO2   01/14/23 1621 01/14/23 1621 01/14/23 1621 01/14/23 1631 01/14/23 1621   118/78 73 20 98.3 °F (36.8 °C) 100 %      MAP       --                Physical Exam    Nursing note and vitals reviewed.  Constitutional: She appears  well-developed and well-nourished.   Cardiovascular:  Normal rate, regular rhythm, normal heart sounds and intact distal pulses.           Pulmonary/Chest: Breath sounds normal. No respiratory distress.   Abdominal: Abdomen is soft. Bowel sounds are normal.     Neurological: She is alert and oriented to person, place, and time.   Skin: Skin is warm and dry. Capillary refill takes less than 2 seconds.   Psychiatric: She has a normal mood and affect. Her behavior is normal. Judgment and thought content normal.       Medical Screening Exam   See Full Note    ED Course   Procedures  Labs Reviewed - No data to display       Imaging Results    None          Medications - No data to display                    Clinical Impression:   Final diagnoses:  [T56.178X] Medication side effect, initial encounter (Primary)        ED Disposition Condition    Discharge Stable          ED Prescriptions    None       Follow-up Information    None          Sanchez Berkowitz MD  01/14/23 6788

## 2023-01-30 ENCOUNTER — OFFICE VISIT (OUTPATIENT)
Dept: OBSTETRICS AND GYNECOLOGY | Facility: CLINIC | Age: 28
End: 2023-01-30
Payer: MEDICAID

## 2023-01-30 VITALS
HEART RATE: 87 BPM | HEIGHT: 63 IN | DIASTOLIC BLOOD PRESSURE: 56 MMHG | RESPIRATION RATE: 18 BRPM | SYSTOLIC BLOOD PRESSURE: 104 MMHG | WEIGHT: 149 LBS | OXYGEN SATURATION: 98 % | BODY MASS INDEX: 26.4 KG/M2 | TEMPERATURE: 98 F

## 2023-01-30 DIAGNOSIS — Z30.018 ENCOUNTER FOR INITIAL PRESCRIPTION OF OTHER CONTRACEPTIVES: ICD-10-CM

## 2023-01-30 DIAGNOSIS — R53.83 FATIGUE, UNSPECIFIED TYPE: ICD-10-CM

## 2023-01-30 DIAGNOSIS — R23.2 HOT FLASHES: Primary | ICD-10-CM

## 2023-01-30 DIAGNOSIS — Z30.09 GENERAL COUNSELING AND ADVICE ON CONTRACEPTIVE MANAGEMENT: ICD-10-CM

## 2023-01-30 PROCEDURE — 3078F DIAST BP <80 MM HG: CPT | Mod: ,,, | Performed by: ADVANCED PRACTICE MIDWIFE

## 2023-01-30 PROCEDURE — 3008F BODY MASS INDEX DOCD: CPT | Mod: ,,, | Performed by: ADVANCED PRACTICE MIDWIFE

## 2023-01-30 PROCEDURE — 3074F SYST BP LT 130 MM HG: CPT | Mod: ,,, | Performed by: ADVANCED PRACTICE MIDWIFE

## 2023-01-30 PROCEDURE — 99203 PR OFFICE/OUTPT VISIT, NEW, LEVL III, 30-44 MIN: ICD-10-PCS | Mod: S$PBB,,, | Performed by: ADVANCED PRACTICE MIDWIFE

## 2023-01-30 PROCEDURE — 99203 OFFICE O/P NEW LOW 30 MIN: CPT | Mod: S$PBB,,, | Performed by: ADVANCED PRACTICE MIDWIFE

## 2023-01-30 PROCEDURE — 3008F PR BODY MASS INDEX (BMI) DOCUMENTED: ICD-10-PCS | Mod: ,,, | Performed by: ADVANCED PRACTICE MIDWIFE

## 2023-01-30 PROCEDURE — 3074F PR MOST RECENT SYSTOLIC BLOOD PRESSURE < 130 MM HG: ICD-10-PCS | Mod: ,,, | Performed by: ADVANCED PRACTICE MIDWIFE

## 2023-01-30 PROCEDURE — 3078F PR MOST RECENT DIASTOLIC BLOOD PRESSURE < 80 MM HG: ICD-10-PCS | Mod: ,,, | Performed by: ADVANCED PRACTICE MIDWIFE

## 2023-01-30 PROCEDURE — 99214 OFFICE O/P EST MOD 30 MIN: CPT | Mod: PBBFAC | Performed by: ADVANCED PRACTICE MIDWIFE

## 2023-01-30 NOTE — PROGRESS NOTES
Subjective:       Patient ID: Martha Vazquez is a 27 y.o. female.    Chief Complaint: Contraception (States that she is having hot flashes, thinks it might be caused by her OC. Last pap was 2021.)    Dr Clark started her on Seasonique approx 1 year ago due to heavy periods.    Periods have improved and is having a cycle every 3 months.   States she is feeling hot all the time.  This began towards the end of the summer.   States even when it was cold outside, she was sweating.   Has been on Truilicity x 1 month, no changes in feeling more hot.  Wants to go back to IUD  Last pap 2021, WNL      Review of Systems   Constitutional:  Positive for fatigue and unexpected weight change.   HENT: Negative.     Eyes: Negative.    Respiratory: Negative.     Cardiovascular: Negative.    Gastrointestinal: Negative.    Endocrine: Negative.    Genitourinary: Negative.    Musculoskeletal: Negative.    Integumentary:  Negative.   Allergic/Immunologic: Negative.    Neurological: Negative.    Psychiatric/Behavioral: Negative.         Objective:      Physical Exam  Vitals reviewed.   Constitutional:       Appearance: Normal appearance.   HENT:      Head: Normocephalic.   Cardiovascular:      Rate and Rhythm: Normal rate and regular rhythm.   Pulmonary:      Effort: Pulmonary effort is normal.      Breath sounds: Normal breath sounds.   Chest:   Breasts:     Right: Normal. No mass.      Left: Normal. No mass.   Abdominal:      General: Abdomen is flat.      Palpations: Abdomen is soft.   Genitourinary:     General: Normal vulva.      Exam position: Lithotomy position.      Vagina: Normal.      Cervix: No cervical motion tenderness.      Uterus: Normal. Not tender.       Adnexa: Right adnexa normal and left adnexa normal.        Right: No mass.          Left: No mass.     Musculoskeletal:         General: Normal range of motion.      Cervical back: Normal range of motion.   Skin:     General: Skin is warm and dry.   Neurological:       Mental Status: She is alert and oriented to person, place, and time.   Psychiatric:         Mood and Affect: Mood normal.         Behavior: Behavior normal.       Assessment:       Problem List Items Addressed This Visit    None  Visit Diagnoses       Hot flashes    -  Primary    Relevant Orders    CBC Auto Differential    Comprehensive Metabolic Panel    TSH    T4, Free    Vitamin D    Fatigue, unspecified type        Relevant Orders    CBC Auto Differential    Comprehensive Metabolic Panel    TSH    T4, Free    Vitamin D    General counseling and advice on contraceptive management        Relevant Orders    Device Authorization Order    Encounter for initial prescription of other contraceptives        Relevant Orders    Device Authorization Order              Plan:       Wants to proceed with Mirena IUD insertion  Labs today.   Collect pap at next visit with IUD insertion

## 2023-02-14 ENCOUNTER — PROCEDURE VISIT (OUTPATIENT)
Dept: OBSTETRICS AND GYNECOLOGY | Facility: CLINIC | Age: 28
End: 2023-02-14
Payer: MEDICAID

## 2023-02-14 VITALS
HEART RATE: 70 BPM | RESPIRATION RATE: 18 BRPM | TEMPERATURE: 98 F | OXYGEN SATURATION: 100 % | DIASTOLIC BLOOD PRESSURE: 70 MMHG | HEIGHT: 63 IN | WEIGHT: 148 LBS | BODY MASS INDEX: 26.22 KG/M2 | SYSTOLIC BLOOD PRESSURE: 110 MMHG

## 2023-02-14 DIAGNOSIS — Z30.430 ENCOUNTER FOR INSERTION OF MIRENA IUD: Primary | ICD-10-CM

## 2023-02-14 DIAGNOSIS — Z12.4 PAP SMEAR FOR CERVICAL CANCER SCREENING: ICD-10-CM

## 2023-02-14 PROCEDURE — 87624 HUMAN PAPILLOMAVIRUS (HPV): ICD-10-PCS | Mod: ,,, | Performed by: CLINICAL MEDICAL LABORATORY

## 2023-02-14 PROCEDURE — 88142 CYTOPATH C/V THIN LAYER: CPT | Mod: TC,GCY | Performed by: ADVANCED PRACTICE MIDWIFE

## 2023-02-14 PROCEDURE — 58300 INSERTION OF IUD-TODAY: ICD-10-PCS | Mod: S$PBB,,, | Performed by: ADVANCED PRACTICE MIDWIFE

## 2023-02-14 PROCEDURE — 99213 PR OFFICE/OUTPT VISIT, EST, LEVL III, 20-29 MIN: ICD-10-PCS | Mod: S$PBB,25,, | Performed by: ADVANCED PRACTICE MIDWIFE

## 2023-02-14 PROCEDURE — 58300 INSERT INTRAUTERINE DEVICE: CPT | Mod: PBBFAC | Performed by: ADVANCED PRACTICE MIDWIFE

## 2023-02-14 PROCEDURE — 99213 OFFICE O/P EST LOW 20 MIN: CPT | Mod: S$PBB,25,, | Performed by: ADVANCED PRACTICE MIDWIFE

## 2023-02-14 PROCEDURE — 87624 HPV HI-RISK TYP POOLED RSLT: CPT | Mod: ,,, | Performed by: CLINICAL MEDICAL LABORATORY

## 2023-02-14 RX ADMIN — LEVONORGESTREL 1 EACH: 52 INTRAUTERINE DEVICE INTRAUTERINE at 09:02

## 2023-02-14 NOTE — PROCEDURES
Insertion of IUD-Today    Date/Time: 2/14/2023 9:45 AM  Performed by: Deedee Valdivia CNM  Authorized by: Deedee Valdivia CNM     Consent:     Consent obtained:  Verbal and written    Consent given by:  Patient    Procedure risks and benefits discussed: yes      Patient questions answered: yes      Patient agrees, verbalizes understanding, and wants to proceed: yes      Instructions and paperwork completed: yes    Procedure:     Pelvic exam performed: yes      Negative urine pregnancy test: Pt has had BTL and on continuous OCs.      Cervix cleaned and prepped: yes      Speculum placed in vagina: yes      Tenaculum applied to cervix: yes      Uterus sounded: yes      Uterus sound depth (cm):  9    IUD inserted with no complications: yes      Strings trimmed: yes    1 each Mirena IUD     Post-procedure:     Patient tolerated procedure well: yes    Comments:      Discussed s/s complications to report.  Take Ibuprofen every 6 hours x 48 hours.  F/U in 6 weeks

## 2023-02-14 NOTE — PROGRESS NOTES
Subjective:       Patient ID: Martha Vazquez is a 27 y.o. female.    Chief Complaint: Contraception (In for Mirena insertion and pap.)    IN for Pap and IUD placement.  Pt has had IUD in the past and did well with this.  Has had BTL and is currently on continuous Ocs to manage HMB.  Denies complaints  Took Ibuprofen prior to appt    Review of Systems   Constitutional: Negative.    HENT: Negative.     Eyes: Negative.    Respiratory: Negative.     Cardiovascular: Negative.    Gastrointestinal: Negative.    Endocrine: Negative.    Genitourinary: Negative.    Musculoskeletal: Negative.    Integumentary:  Negative.   Allergic/Immunologic: Negative.    Neurological: Negative.    Psychiatric/Behavioral: Negative.         Objective:      Physical Exam  Vitals reviewed.   Constitutional:       Appearance: Normal appearance.   HENT:      Head: Normocephalic.   Cardiovascular:      Rate and Rhythm: Normal rate.   Pulmonary:      Effort: Pulmonary effort is normal.   Abdominal:      General: Abdomen is flat.      Palpations: Abdomen is soft.   Genitourinary:     General: Normal vulva.      Vagina: Normal.      Cervix: Normal.      Uterus: Normal.       Comments: Pap collected  Musculoskeletal:         General: Normal range of motion.   Skin:     General: Skin is warm and dry.   Neurological:      Mental Status: She is alert and oriented to person, place, and time.   Psychiatric:         Mood and Affect: Mood normal.         Behavior: Behavior normal.       Assessment:       Problem List Items Addressed This Visit    None  Visit Diagnoses       Encounter for insertion of mirena IUD    -  Primary    Relevant Medications    levonorgestreL (MIRENA) 20 mcg/24 hours (8 yrs) 52 mg IUD 1 Intra Uterine Device (Completed) (Start on 2/14/2023 12:15 PM)    Other Relevant Orders    Insertion of IUD-Today (Completed)    Pap smear for cervical cancer screening        Relevant Orders    ThinPrep Pap Test              Plan:       F/U in 6 weeks  for IUD string check or any complications.  Discussed poss side effect of irregular bleeding with IUD x 3-4 months.  Finish current pack of Ocs then discontinue

## 2023-02-16 LAB
GH SERPL-MCNC: NORMAL NG/ML
INSULIN SERPL-ACNC: NORMAL U[IU]/ML
LAB AP CLINICAL INFORMATION: NORMAL
LAB AP GYN INTERPRETATION: NEGATIVE
LAB AP PAP DISCLAIMER COMMENTS: NORMAL
RENIN PLAS-CCNC: NORMAL NG/ML/H

## 2023-02-21 LAB
HPV 16: NEGATIVE
HPV 18: NEGATIVE
HPV OTHER: NEGATIVE

## 2023-03-14 ENCOUNTER — TELEPHONE (OUTPATIENT)
Dept: OBSTETRICS AND GYNECOLOGY | Facility: CLINIC | Age: 28
End: 2023-03-14
Payer: MEDICAID

## 2023-03-14 NOTE — TELEPHONE ENCOUNTER
Returned patient's call re: IUD insertion on 02/14/2023;  has been bleeding every day since;  more than spotting but not a heavy flow; 2 -3 reg absorbency tampons/day;  mild cramping but more of a pressure type feeling.  Voiced concern with daily bleeding. Reassured patient.  Advised may take ibuprofen 800mg every 6 hrs for 48 hours for relief of cramping and that irregular bleeding is normal and to be expected and can last 3 - 4 months.  Advised to keep upcoming appointment on 3/28.  Patient voiced agreement and understanding.    ----- Message from Ilene Palacios sent at 3/7/2023  1:37 PM CST -----  Regarding: IUD, Heavy flow  Pt has questions about IUD / Birth Control.Call back [phone].thank you

## 2023-03-28 ENCOUNTER — OFFICE VISIT (OUTPATIENT)
Dept: OBSTETRICS AND GYNECOLOGY | Facility: CLINIC | Age: 28
End: 2023-03-28
Payer: MEDICAID

## 2023-03-28 VITALS
HEIGHT: 63 IN | TEMPERATURE: 98 F | SYSTOLIC BLOOD PRESSURE: 102 MMHG | HEART RATE: 70 BPM | DIASTOLIC BLOOD PRESSURE: 68 MMHG | WEIGHT: 135 LBS | BODY MASS INDEX: 23.92 KG/M2 | RESPIRATION RATE: 18 BRPM | OXYGEN SATURATION: 100 %

## 2023-03-28 DIAGNOSIS — Z30.431 IUD CHECK UP: Primary | ICD-10-CM

## 2023-03-28 PROCEDURE — 99213 OFFICE O/P EST LOW 20 MIN: CPT | Mod: S$PBB,,, | Performed by: ADVANCED PRACTICE MIDWIFE

## 2023-03-28 PROCEDURE — 99213 PR OFFICE/OUTPT VISIT, EST, LEVL III, 20-29 MIN: ICD-10-PCS | Mod: S$PBB,,, | Performed by: ADVANCED PRACTICE MIDWIFE

## 2023-03-28 PROCEDURE — 99214 OFFICE O/P EST MOD 30 MIN: CPT | Mod: PBBFAC | Performed by: ADVANCED PRACTICE MIDWIFE

## 2023-03-28 PROCEDURE — 3078F PR MOST RECENT DIASTOLIC BLOOD PRESSURE < 80 MM HG: ICD-10-PCS | Mod: ,,, | Performed by: ADVANCED PRACTICE MIDWIFE

## 2023-03-28 PROCEDURE — 3074F PR MOST RECENT SYSTOLIC BLOOD PRESSURE < 130 MM HG: ICD-10-PCS | Mod: ,,, | Performed by: ADVANCED PRACTICE MIDWIFE

## 2023-03-28 PROCEDURE — 3074F SYST BP LT 130 MM HG: CPT | Mod: ,,, | Performed by: ADVANCED PRACTICE MIDWIFE

## 2023-03-28 PROCEDURE — 3078F DIAST BP <80 MM HG: CPT | Mod: ,,, | Performed by: ADVANCED PRACTICE MIDWIFE

## 2023-03-28 PROCEDURE — 3008F PR BODY MASS INDEX (BMI) DOCUMENTED: ICD-10-PCS | Mod: ,,, | Performed by: ADVANCED PRACTICE MIDWIFE

## 2023-03-28 PROCEDURE — 3008F BODY MASS INDEX DOCD: CPT | Mod: ,,, | Performed by: ADVANCED PRACTICE MIDWIFE

## 2023-03-28 RX ORDER — LEVONORGESTREL 52 MG/1
1 INTRAUTERINE DEVICE INTRAUTERINE ONCE
COMMUNITY
End: 2024-02-05

## 2023-03-28 RX ORDER — MEDROXYPROGESTERONE ACETATE 2.5 MG/1
2.5 TABLET ORAL DAILY
Qty: 14 TABLET | Refills: 0 | Status: SHIPPED | OUTPATIENT
Start: 2023-03-28 | End: 2023-04-11

## 2023-03-28 RX ORDER — NAPROXEN 500 MG/1
500 TABLET ORAL 2 TIMES DAILY PRN
Qty: 30 TABLET | Refills: 5 | Status: SHIPPED | OUTPATIENT
Start: 2023-03-28 | End: 2023-04-11

## 2023-03-28 RX ORDER — PHENTERMINE HYDROCHLORIDE 37.5 MG/1
37.5 TABLET ORAL
COMMUNITY
End: 2023-06-05 | Stop reason: ALTCHOICE

## 2023-03-28 NOTE — PROGRESS NOTES
Subjective     Patient ID: Martha Vazquez is a 27 y.o. female.    Chief Complaint: IUD Check (In for A f/u IUD string check. Denies any problems ar concerns at this time.)    In for IUD string check  Is having irregular bleeding.  Some days are heavy enough to need a super tampon.  C/O pelvic pressure  Pt with history HMB    Review of Systems   Constitutional: Negative.    HENT: Negative.     Eyes: Negative.    Respiratory: Negative.     Cardiovascular: Negative.    Gastrointestinal: Negative.    Endocrine: Negative.    Genitourinary:  Positive for menstrual irregularity and menstrual problem.   Musculoskeletal: Negative.    Integumentary:  Negative.   Neurological: Negative.    Psychiatric/Behavioral: Negative.          Objective     Physical Exam  Vitals reviewed.   Constitutional:       Appearance: Normal appearance.   HENT:      Head: Normocephalic.   Cardiovascular:      Rate and Rhythm: Normal rate.   Pulmonary:      Effort: Pulmonary effort is normal.   Abdominal:      General: Abdomen is flat.      Palpations: Abdomen is soft.   Genitourinary:     General: Normal vulva.      Vagina: Normal.      Cervix: Normal.      Comments: IUD strings extending from cervical os  Musculoskeletal:         General: Normal range of motion.   Skin:     General: Skin is warm and dry.   Neurological:      Mental Status: She is alert and oriented to person, place, and time.   Psychiatric:         Mood and Affect: Mood normal.         Behavior: Behavior normal.          Assessment and Plan     Problem List Items Addressed This Visit    None  Visit Diagnoses       IUD check up    -  Primary    Relevant Medications    medroxyPROGESTERone (PROVERA) 2.5 MG tablet    naproxen (NAPROSYN) 500 MG tablet            Naproxen twice daily x 72 hours then as needed for cramping/pain  Provera 2.5mg x 14 days

## 2023-04-11 ENCOUNTER — OFFICE VISIT (OUTPATIENT)
Dept: NEUROLOGY | Facility: CLINIC | Age: 28
End: 2023-04-11
Payer: MEDICAID

## 2023-04-11 VITALS
BODY MASS INDEX: 23.92 KG/M2 | DIASTOLIC BLOOD PRESSURE: 60 MMHG | HEART RATE: 101 BPM | HEIGHT: 63 IN | WEIGHT: 135 LBS | SYSTOLIC BLOOD PRESSURE: 98 MMHG | OXYGEN SATURATION: 100 %

## 2023-04-11 DIAGNOSIS — G43.109 MIGRAINE WITH AURA AND WITHOUT STATUS MIGRAINOSUS, NOT INTRACTABLE: Primary | ICD-10-CM

## 2023-04-11 PROCEDURE — 3078F PR MOST RECENT DIASTOLIC BLOOD PRESSURE < 80 MM HG: ICD-10-PCS | Mod: ,,, | Performed by: PSYCHIATRY & NEUROLOGY

## 2023-04-11 PROCEDURE — 99214 OFFICE O/P EST MOD 30 MIN: CPT | Mod: S$PBB,,, | Performed by: PSYCHIATRY & NEUROLOGY

## 2023-04-11 PROCEDURE — 99214 OFFICE O/P EST MOD 30 MIN: CPT | Mod: PBBFAC | Performed by: PSYCHIATRY & NEUROLOGY

## 2023-04-11 PROCEDURE — 3008F PR BODY MASS INDEX (BMI) DOCUMENTED: ICD-10-PCS | Mod: ,,, | Performed by: PSYCHIATRY & NEUROLOGY

## 2023-04-11 PROCEDURE — 3074F SYST BP LT 130 MM HG: CPT | Mod: ,,, | Performed by: PSYCHIATRY & NEUROLOGY

## 2023-04-11 PROCEDURE — 3074F PR MOST RECENT SYSTOLIC BLOOD PRESSURE < 130 MM HG: ICD-10-PCS | Mod: ,,, | Performed by: PSYCHIATRY & NEUROLOGY

## 2023-04-11 PROCEDURE — 3008F BODY MASS INDEX DOCD: CPT | Mod: ,,, | Performed by: PSYCHIATRY & NEUROLOGY

## 2023-04-11 PROCEDURE — 99214 PR OFFICE/OUTPT VISIT, EST, LEVL IV, 30-39 MIN: ICD-10-PCS | Mod: S$PBB,,, | Performed by: PSYCHIATRY & NEUROLOGY

## 2023-04-11 PROCEDURE — 3078F DIAST BP <80 MM HG: CPT | Mod: ,,, | Performed by: PSYCHIATRY & NEUROLOGY

## 2023-04-11 RX ORDER — TOPIRAMATE 50 MG/1
100 TABLET, FILM COATED ORAL 2 TIMES DAILY
Qty: 360 TABLET | Refills: 3 | Status: SHIPPED | OUTPATIENT
Start: 2023-04-11 | End: 2023-07-28

## 2023-04-11 NOTE — PROGRESS NOTES
Subjective:       Patient ID: Martha Vazquez is a 27 y.o. female     Chief Complaint:    Chief Complaint   Patient presents with    follow up        Allergies:  Lortab [hydrocodone-acetaminophen] and Pcn [penicillins]    Current Medications:    Outpatient Encounter Medications as of 4/11/2023   Medication Sig Dispense Refill    levonorgestreL (MIRENA) 21 mcg/24 hours (8 yrs) 52 mg IUD 1 each by Intrauterine route once.      topiramate (TOPAMAX) 50 MG tablet Take 1 tablet (50 mg total) by mouth 2 (two) times daily. 180 tablet 3    azithromycin (Z-VANESSA) 250 MG tablet Take by mouth.      ondansetron (ZOFRAN-ODT) 4 MG TbDL Take 4 mg by mouth.      phentermine (ADIPEX-P) 37.5 mg tablet Take 37.5 mg by mouth before breakfast.      promethazine (PHENERGAN) 25 MG tablet Take by mouth.      TRULICITY 0.75 mg/0.5 mL pen injector Inject into the skin.      [DISCONTINUED] L norgest/e.estradioL-e.estrad (SEASONIQUE) 0.15 mg-30 mcg (84)/10 mcg (7) 3MPk Take 1 tablet by mouth Daily.      [DISCONTINUED] medroxyPROGESTERone (PROVERA) 2.5 MG tablet Take 1 tablet (2.5 mg total) by mouth once daily. (Patient not taking: Reported on 4/11/2023) 14 tablet 0    [DISCONTINUED] naproxen (NAPROSYN) 500 MG tablet Take 1 tablet (500 mg total) by mouth 2 (two) times daily as needed (cramping). (Patient not taking: Reported on 4/11/2023) 30 tablet 5    [DISCONTINUED] rizatriptan (MAXALT) 10 MG tablet Take 1 tablet (10 mg total) by mouth as needed for Migraine (onset of migraine). (Patient not taking: Reported on 1/30/2023) 27 tablet 3     No facility-administered encounter medications on file as of 4/11/2023.       History of Present Illness  26 yo WF w/ migraines wellreduced w/TOPAMAX 50 mg bid   Maxalt prn aborts her HEADACHE 's well   Her freq now 3x /month and she is satisfied w/ results        Past Medical History:   Diagnosis Date    39 weeks gestation of pregnancy 5/3/2021    Rh negative state in antepartum period 5/3/2021       Past  "Surgical History:   Procedure Laterality Date    tongue clipped      TUBAL LIGATION         Social History  Ms. Vazquez  reports that she has been smoking vaping with nicotine. She uses smokeless tobacco. She reports that she does not drink alcohol and does not use drugs.    Family History  Ms.'s Vazqeuz family history includes Hypertension in her father and paternal grandfather; Stroke in her father and paternal grandfather.    Review of Systems  Review of Systems   Neurological:  Positive for headaches.   All other systems reviewed and are negative.   Objective:   BP 98/60 (BP Location: Left arm, Patient Position: Sitting, BP Method: Large (Manual))   Pulse 101   Ht 5' 3" (1.6 m)   Wt 61.2 kg (135 lb)   LMP  (LMP Unknown)   SpO2 100%   BMI 23.91 kg/m²    NEUROLOGICAL EXAMINATION:     MENTAL STATUS   Oriented to person, place, and time.   Level of consciousness: alert  Knowledge: good.     CRANIAL NERVES   Cranial nerves II through XII intact.     MOTOR EXAM     Strength   Strength 5/5 throughout.     GAIT AND COORDINATION     Gait  Gait: normal     Physical Exam  Vitals reviewed.   Constitutional:       Appearance: She is normal weight.   Neurological:      General: No focal deficit present.      Mental Status: She is alert and oriented to person, place, and time. Mental status is at baseline.      Cranial Nerves: Cranial nerves 2-12 are intact.      Motor: Motor strength is normal.      Gait: Gait is intact.        Assessment:     Migraine with aura and without status migrainosus, not intractable         Primary Diagnosis and ICD10  Migraine with aura and without status migrainosus, not intractable [G43.109]    Plan:     Patient Instructions   Cont TOPAMAX but incr to 100 mg twice daily  Cont Maxalt as needed onset of migraine  Avoid any triggers   F/u 3 months    Medications Discontinued During This Encounter   Medication Reason    medroxyPROGESTERone (PROVERA) 2.5 MG tablet     naproxen (NAPROSYN) 500 MG " tablet     rizatriptan (MAXALT) 10 MG tablet        Requested Prescriptions      No prescriptions requested or ordered in this encounter

## 2023-04-11 NOTE — PATIENT INSTRUCTIONS
Cont TOPAMAX but incr to 100 mg twice daily  Cont Maxalt as needed onset of migraine  Avoid any triggers   F/u 3 months

## 2023-04-17 ENCOUNTER — HOSPITAL ENCOUNTER (OUTPATIENT)
Dept: RADIOLOGY | Facility: HOSPITAL | Age: 28
Discharge: HOME | End: 2023-04-17
Attending: NURSE PRACTITIONER
Payer: MEDICAID

## 2023-04-17 DIAGNOSIS — M54.2 NECK PAIN: ICD-10-CM

## 2023-04-17 DIAGNOSIS — M54.9 UPPER BACK PAIN: ICD-10-CM

## 2023-04-17 PROCEDURE — 72070 X-RAY EXAM THORAC SPINE 2VWS: CPT | Mod: TC

## 2023-04-17 PROCEDURE — 72040 X-RAY EXAM NECK SPINE 2-3 VW: CPT | Mod: TC

## 2023-05-02 ENCOUNTER — OFFICE VISIT (OUTPATIENT)
Dept: OBSTETRICS AND GYNECOLOGY | Facility: CLINIC | Age: 28
End: 2023-05-02
Payer: MEDICAID

## 2023-05-02 VITALS
OXYGEN SATURATION: 100 % | TEMPERATURE: 98 F | RESPIRATION RATE: 18 BRPM | HEART RATE: 78 BPM | SYSTOLIC BLOOD PRESSURE: 108 MMHG | WEIGHT: 136 LBS | BODY MASS INDEX: 24.1 KG/M2 | HEIGHT: 63 IN | DIASTOLIC BLOOD PRESSURE: 68 MMHG

## 2023-05-02 DIAGNOSIS — N92.1 BREAKTHROUGH BLEEDING ASSOCIATED WITH INTRAUTERINE DEVICE (IUD): ICD-10-CM

## 2023-05-02 DIAGNOSIS — Z30.431 IUD CHECK UP: Primary | ICD-10-CM

## 2023-05-02 DIAGNOSIS — R10.2 PELVIC PAIN: ICD-10-CM

## 2023-05-02 DIAGNOSIS — Z97.5 BREAKTHROUGH BLEEDING ASSOCIATED WITH INTRAUTERINE DEVICE (IUD): ICD-10-CM

## 2023-05-02 PROCEDURE — 3078F DIAST BP <80 MM HG: CPT | Mod: ,,, | Performed by: ADVANCED PRACTICE MIDWIFE

## 2023-05-02 PROCEDURE — 3074F PR MOST RECENT SYSTOLIC BLOOD PRESSURE < 130 MM HG: ICD-10-PCS | Mod: ,,, | Performed by: ADVANCED PRACTICE MIDWIFE

## 2023-05-02 PROCEDURE — 3074F SYST BP LT 130 MM HG: CPT | Mod: ,,, | Performed by: ADVANCED PRACTICE MIDWIFE

## 2023-05-02 PROCEDURE — 3008F BODY MASS INDEX DOCD: CPT | Mod: ,,, | Performed by: ADVANCED PRACTICE MIDWIFE

## 2023-05-02 PROCEDURE — 99213 PR OFFICE/OUTPT VISIT, EST, LEVL III, 20-29 MIN: ICD-10-PCS | Mod: S$PBB,,, | Performed by: ADVANCED PRACTICE MIDWIFE

## 2023-05-02 PROCEDURE — 99214 OFFICE O/P EST MOD 30 MIN: CPT | Mod: PBBFAC | Performed by: ADVANCED PRACTICE MIDWIFE

## 2023-05-02 PROCEDURE — 99213 OFFICE O/P EST LOW 20 MIN: CPT | Mod: S$PBB,,, | Performed by: ADVANCED PRACTICE MIDWIFE

## 2023-05-02 PROCEDURE — 3008F PR BODY MASS INDEX (BMI) DOCUMENTED: ICD-10-PCS | Mod: ,,, | Performed by: ADVANCED PRACTICE MIDWIFE

## 2023-05-02 PROCEDURE — 3078F PR MOST RECENT DIASTOLIC BLOOD PRESSURE < 80 MM HG: ICD-10-PCS | Mod: ,,, | Performed by: ADVANCED PRACTICE MIDWIFE

## 2023-05-02 RX ORDER — NAPROXEN 500 MG/1
500 TABLET ORAL 2 TIMES DAILY
Qty: 30 TABLET | Refills: 5 | Status: SHIPPED | OUTPATIENT
Start: 2023-05-02 | End: 2024-02-05

## 2023-05-02 RX ORDER — CLINDAMYCIN HYDROCHLORIDE 300 MG/1
300 CAPSULE ORAL EVERY 8 HOURS
Qty: 21 CAPSULE | Refills: 0 | Status: SHIPPED | OUTPATIENT
Start: 2023-05-02 | End: 2023-05-09

## 2023-05-02 NOTE — PROGRESS NOTES
"Subjective     Patient ID: Martha Vazquez is a 27 y.o. female.    Chief Complaint: IUD Check (Stated that she is cramping and bleeding with her IUD still. Stated that she has tried the heating pad and OTC med for the cramps, but nothing has help. Stated that she " has a uncomfortable feeling in her stomach." Stated that 05/10/2023 would make 3 months of have the IUD.Stated that when they have had SA it was painful and makes the bleeding worse.)    Having bleeding every day since IUD and pain with SA    Vaginal Bleeding  The current episode started more than 1 month ago. The problem occurs constantly. The problem has been unchanged. The pain is mild. The problem affects both sides. She is not pregnant. Associated symptoms include abdominal pain, anorexia, back pain, constipation, painful intercourse and urgency. Pertinent negatives include no chills, diarrhea, discolored urine, dysuria, fever, flank pain, frequency, headaches, hematuria, nausea, rash or vomiting. The vaginal bleeding is typical of menses. She has not been passing clots. She has not been passing tissue. The symptoms are aggravated by intercourse and tactile pressure. She has tried acetaminophen, heating pad, NSAIDs and warm bath for the symptoms. The treatment provided no relief. She is sexually active. No, her partner does not have an STD. She uses an IUD for contraception. Her menstrual history has been irregular. Her past medical history is significant for menorrhagia. There is no history of an abdominal surgery, a  section, an ectopic pregnancy, endometriosis, a gynecological surgery, herpes simplex, metrorrhagia, miscarriage, ovarian cysts, perineal abscess, PID, an STD, a terminated pregnancy or vaginosis.   Review of Systems   Constitutional: Negative.  Negative for chills and fever.   HENT: Negative.     Eyes: Negative.    Respiratory: Negative.     Cardiovascular: Negative.    Gastrointestinal:  Positive for abdominal pain, anorexia and " constipation. Negative for diarrhea, nausea and vomiting.   Endocrine: Negative.    Genitourinary:  Positive for menorrhagia, menstrual irregularity, menstrual problem, urgency and vaginal bleeding. Negative for dysuria, flank pain, frequency and hematuria.   Musculoskeletal:  Positive for back pain.   Integumentary:  Negative for rash. Negative.   Neurological: Negative.  Negative for headaches.   Psychiatric/Behavioral: Negative.          Objective     Physical Exam  Vitals reviewed.   Constitutional:       Appearance: Normal appearance.   HENT:      Head: Normocephalic.   Cardiovascular:      Rate and Rhythm: Normal rate.   Pulmonary:      Effort: Pulmonary effort is normal.   Abdominal:      General: Abdomen is flat.      Palpations: Abdomen is soft.   Genitourinary:     General: Normal vulva.      Exam position: Lithotomy position.      Vagina: Normal. No vaginal discharge or bleeding.      Cervix: No discharge, lesion or cervical bleeding.      Uterus: Tender. Not enlarged.       Comments: Small amount thin menstrual blood noted in vaginal vault.  Tenderness with uterine palpation  IUD strings noted extending from cervical os  Musculoskeletal:         General: Normal range of motion.   Skin:     General: Skin is warm and dry.   Neurological:      Mental Status: She is alert and oriented to person, place, and time.   Psychiatric:         Mood and Affect: Mood normal.         Behavior: Behavior normal.          Assessment and Plan     Problem List Items Addressed This Visit    None  Visit Diagnoses       IUD check up    -  Primary    Breakthrough bleeding associated with intrauterine device (IUD)        Pelvic pain        Relevant Medications    naproxen (NAPROSYN) 500 MG tablet    clindamycin (CLEOCIN) 300 MG capsule            Naproxen twice daily x 3 days then prn  Clindamycin as prescribed  Lo Loestrin x 1 month  Discussed with pt that irregular bleeding can be normal for the first 3-6 months after IUD  insertion.  If bleeding and pain do not improve, or if they worsen, call for follow up

## 2023-05-02 NOTE — PROGRESS NOTES
Answers submitted by the patient for this visit:  Vaginal Bleeding Questionnaire  (Submitted on 2023)  Chief Complaint: Vaginal bleeding  Onset: more than 1 month ago  Frequency: constantly  Progression since onset: unchanged  Pain severity: mild  Affected side: both  Pregnant now?: No  abdominal pain: Yes  anorexia: Yes  back pain: Yes  chills: No  constipation: Yes  diarrhea: No  discolored urine: No  dysuria: No  fever: No  flank pain: No  frequency: No  headaches: No  hematuria: No  nausea: No  painful intercourse: Yes  rash: No  urgency: Yes  vomiting: No  Vaginal bleeding: typical of menses  Passing clots?: No  Passing tissue?: No  Aggravated by: intercourse, tactile pressure  treatments tried: acetaminophen, heating pad, NSAIDs, warm bath  Improvement on treatment: no relief  Sexual activity: sexually active  Partner with STD symptoms: no  Birth control: an IUD  Menstrual history: irregular  STD: No  abdominal surgery: No   section: No  Ectopic pregnancy: No  Endometriosis: No  herpes simplex: No  gynecological surgery: No  menorrhagia: Yes  metrorrhagia: No  miscarriage: No  ovarian cysts: No  perineal abscess: No  PID: No  terminated pregnancy: No  vaginosis: No

## 2023-05-26 DIAGNOSIS — M25.511 RIGHT SHOULDER PAIN: Primary | ICD-10-CM

## 2023-06-01 ENCOUNTER — HOSPITAL ENCOUNTER (OUTPATIENT)
Dept: RADIOLOGY | Facility: HOSPITAL | Age: 28
Discharge: HOME | End: 2023-06-01
Attending: NURSE PRACTITIONER
Payer: MEDICAID

## 2023-06-01 DIAGNOSIS — R05.9 COUGH, UNSPECIFIED TYPE: ICD-10-CM

## 2023-06-01 PROCEDURE — 71046 X-RAY EXAM CHEST 2 VIEWS: CPT | Mod: TC

## 2023-06-05 ENCOUNTER — OFFICE VISIT (OUTPATIENT)
Dept: OBSTETRICS AND GYNECOLOGY | Facility: CLINIC | Age: 28
End: 2023-06-05
Payer: MEDICAID

## 2023-06-05 ENCOUNTER — PATIENT MESSAGE (OUTPATIENT)
Dept: OBSTETRICS AND GYNECOLOGY | Facility: CLINIC | Age: 28
End: 2023-06-05
Payer: MEDICAID

## 2023-06-05 ENCOUNTER — HOSPITAL ENCOUNTER (OUTPATIENT)
Dept: RADIOLOGY | Facility: HOSPITAL | Age: 28
Discharge: HOME | End: 2023-06-05
Attending: ADVANCED PRACTICE MIDWIFE
Payer: MEDICAID

## 2023-06-05 VITALS
DIASTOLIC BLOOD PRESSURE: 62 MMHG | HEART RATE: 82 BPM | BODY MASS INDEX: 22.5 KG/M2 | SYSTOLIC BLOOD PRESSURE: 100 MMHG | OXYGEN SATURATION: 98 % | HEIGHT: 63 IN | WEIGHT: 127 LBS | RESPIRATION RATE: 17 BRPM | TEMPERATURE: 98 F

## 2023-06-05 DIAGNOSIS — Z30.40 ENCOUNTER FOR SURVEILLANCE OF CONTRACEPTIVES, UNSPECIFIED CONTRACEPTIVE: ICD-10-CM

## 2023-06-05 DIAGNOSIS — N92.1 MENORRHAGIA WITH IRREGULAR CYCLE: Primary | ICD-10-CM

## 2023-06-05 DIAGNOSIS — N92.1 MENORRHAGIA WITH IRREGULAR CYCLE: ICD-10-CM

## 2023-06-05 PROCEDURE — 3008F PR BODY MASS INDEX (BMI) DOCUMENTED: ICD-10-PCS | Mod: ,,, | Performed by: ADVANCED PRACTICE MIDWIFE

## 2023-06-05 PROCEDURE — 99213 PR OFFICE/OUTPT VISIT, EST, LEVL III, 20-29 MIN: ICD-10-PCS | Mod: S$PBB,,, | Performed by: ADVANCED PRACTICE MIDWIFE

## 2023-06-05 PROCEDURE — 3078F DIAST BP <80 MM HG: CPT | Mod: ,,, | Performed by: ADVANCED PRACTICE MIDWIFE

## 2023-06-05 PROCEDURE — 3078F PR MOST RECENT DIASTOLIC BLOOD PRESSURE < 80 MM HG: ICD-10-PCS | Mod: ,,, | Performed by: ADVANCED PRACTICE MIDWIFE

## 2023-06-05 PROCEDURE — 76856 US EXAM PELVIC COMPLETE: CPT | Mod: TC

## 2023-06-05 PROCEDURE — 3074F PR MOST RECENT SYSTOLIC BLOOD PRESSURE < 130 MM HG: ICD-10-PCS | Mod: ,,, | Performed by: ADVANCED PRACTICE MIDWIFE

## 2023-06-05 PROCEDURE — 76856 US PELVIS COMPLETE NON OB: ICD-10-PCS | Mod: 26,,, | Performed by: RADIOLOGY

## 2023-06-05 PROCEDURE — 99215 OFFICE O/P EST HI 40 MIN: CPT | Mod: PBBFAC,25 | Performed by: ADVANCED PRACTICE MIDWIFE

## 2023-06-05 PROCEDURE — 3008F BODY MASS INDEX DOCD: CPT | Mod: ,,, | Performed by: ADVANCED PRACTICE MIDWIFE

## 2023-06-05 PROCEDURE — 3074F SYST BP LT 130 MM HG: CPT | Mod: ,,, | Performed by: ADVANCED PRACTICE MIDWIFE

## 2023-06-05 PROCEDURE — 76856 US EXAM PELVIC COMPLETE: CPT | Mod: 26,,, | Performed by: RADIOLOGY

## 2023-06-05 PROCEDURE — 99213 OFFICE O/P EST LOW 20 MIN: CPT | Mod: S$PBB,,, | Performed by: ADVANCED PRACTICE MIDWIFE

## 2023-06-05 RX ORDER — ALBUTEROL SULFATE 1.25 MG/3ML
SOLUTION RESPIRATORY (INHALATION)
COMMUNITY
Start: 2023-06-01 | End: 2024-02-05

## 2023-06-05 NOTE — PROGRESS NOTES
Subjective     Patient ID: Martha Vazquez is a 28 y.o. female.    Chief Complaint: IUD Check (Pt states she has been bleeding since getting IUD in February /)    Martha is complaining of continual bleeding since a Mirena was placed in February.  She had had one before her pregnancy and did not have any problems.  She got the IUD for bleeding problems.  She has had a BTL.  She says it is just not working this time and she wants help.   She has tried ocps to stop the bleeding.  She does not want more children so we discussed EMA as an option and she is interested.  Review of Systems   Constitutional: Negative.    HENT: Negative.     Respiratory: Negative.     Cardiovascular: Negative.    Gastrointestinal: Negative.    Genitourinary:  Positive for menstrual irregularity, menstrual problem and vaginal bleeding.   Musculoskeletal: Negative.    Integumentary:  Negative.   Neurological: Negative.    Psychiatric/Behavioral: Negative.          Objective     Physical Exam  Vitals and nursing note reviewed.   Constitutional:       Appearance: She is normal weight.   HENT:      Head: Normocephalic.      Nose: Nose normal.   Eyes:      Extraocular Movements: Extraocular movements intact.   Cardiovascular:      Rate and Rhythm: Normal rate.   Pulmonary:      Effort: Pulmonary effort is normal.   Abdominal:      General: Abdomen is flat. There is no distension.      Palpations: Abdomen is soft. There is no mass.      Tenderness: There is no abdominal tenderness. There is no guarding.   Skin:     General: Skin is warm and dry.   Neurological:      Mental Status: She is alert and oriented to person, place, and time.   Psychiatric:         Mood and Affect: Mood normal.         Behavior: Behavior normal.        Assessment and Plan     1. Encounter for surveillance of contraceptives, unspecified contraceptive    2. Menorrhagia with irregular cycle  -     CBC Auto Differential; Future; Expected date: 06/05/2023  -     US Pelvis Complete  Non OB; Future; Expected date: 06/05/2023    Plan:  CBC  Pelvic u/s  Refer to Dr. Garland for possible endometrial ablation           No follow-ups on file.

## 2023-06-29 ENCOUNTER — OFFICE VISIT (OUTPATIENT)
Dept: OBSTETRICS AND GYNECOLOGY | Facility: CLINIC | Age: 28
End: 2023-06-29
Payer: MEDICAID

## 2023-06-29 VITALS
DIASTOLIC BLOOD PRESSURE: 78 MMHG | BODY MASS INDEX: 22.15 KG/M2 | HEIGHT: 63 IN | SYSTOLIC BLOOD PRESSURE: 98 MMHG | WEIGHT: 125 LBS

## 2023-06-29 DIAGNOSIS — Z30.40 ENCOUNTER FOR SURVEILLANCE OF CONTRACEPTIVES, UNSPECIFIED CONTRACEPTIVE: ICD-10-CM

## 2023-06-29 DIAGNOSIS — N92.1 MENORRHAGIA WITH IRREGULAR CYCLE: Primary | ICD-10-CM

## 2023-06-29 PROCEDURE — 99214 OFFICE O/P EST MOD 30 MIN: CPT | Mod: PBBFAC | Performed by: OBSTETRICS & GYNECOLOGY

## 2023-06-29 PROCEDURE — 99214 OFFICE O/P EST MOD 30 MIN: CPT | Mod: S$PBB,,, | Performed by: OBSTETRICS & GYNECOLOGY

## 2023-06-29 PROCEDURE — 3008F BODY MASS INDEX DOCD: CPT | Mod: ,,, | Performed by: OBSTETRICS & GYNECOLOGY

## 2023-06-29 PROCEDURE — 3074F SYST BP LT 130 MM HG: CPT | Mod: ,,, | Performed by: OBSTETRICS & GYNECOLOGY

## 2023-06-29 PROCEDURE — 3008F PR BODY MASS INDEX (BMI) DOCUMENTED: ICD-10-PCS | Mod: ,,, | Performed by: OBSTETRICS & GYNECOLOGY

## 2023-06-29 PROCEDURE — 3078F DIAST BP <80 MM HG: CPT | Mod: ,,, | Performed by: OBSTETRICS & GYNECOLOGY

## 2023-06-29 PROCEDURE — 3074F PR MOST RECENT SYSTOLIC BLOOD PRESSURE < 130 MM HG: ICD-10-PCS | Mod: ,,, | Performed by: OBSTETRICS & GYNECOLOGY

## 2023-06-29 PROCEDURE — 3078F PR MOST RECENT DIASTOLIC BLOOD PRESSURE < 80 MM HG: ICD-10-PCS | Mod: ,,, | Performed by: OBSTETRICS & GYNECOLOGY

## 2023-06-29 PROCEDURE — 99214 PR OFFICE/OUTPT VISIT, EST, LEVL IV, 30-39 MIN: ICD-10-PCS | Mod: S$PBB,,, | Performed by: OBSTETRICS & GYNECOLOGY

## 2023-07-05 NOTE — PROGRESS NOTES
Martha Vazquez female  for   Chief Complaint   Patient presents with    surgery consult     Pt is here stating that she does not want the ablation. She would love to have a partial hysterectomy she does not want her ovaries taken out.      OB History          3    Para   3    Term   3            AB        Living   3         SAB        IAB        Ectopic        Multiple   0    Live Births   3                  Past Medical History:   Diagnosis Date    39 weeks gestation of pregnancy 5/3/2021    Pregnant and not yet delivered 3/16/2021    Rh negative state in antepartum period 5/3/2021      Past Surgical History:   Procedure Laterality Date    tongue clipped      TUBAL LIGATION        Review of patient's allergies indicates:   Allergen Reactions    Lortab [hydrocodone-acetaminophen] Nausea And Vomiting    Pcn [penicillins] Rash             Physical exam:     General Appearance: healthy    Abdomen:Normal, benign.    Pelvic: Pelvic exam: normal external genitalia, vulva, vagina, cervix, uterus and adnexa, VULVA: normal appearing vulva with no masses, tenderness or lesions, CERVIX: normal appearing cervix without discharge or lesions, IUD string is visible,UTERUS:  Mildly enlarged and boggy, ADNEXA: normal adnexa in size, nontender and no masses, RECTAL:deferred normal rectal, no masses, guaiac negative stool obtained.     Extremity:normal    Skin: normal exam        Assessment:   Problem List Items Addressed This Visit    None  Visit Diagnoses       Menorrhagia with irregular cycle        Encounter for surveillance of contraceptives, unspecified contraceptive                 Plan:  Because the patient is bleeding and pain she is elected to proceed with a robotic hysterectomy.  Her IUD will be removed prior to her surgery.  Her ultrasound was reviewed and was essentially normal.  The IUD appeared to be placed properly.  She does have a simple right ovarian cyst.  This information was confirmed to her  healthcare provider.

## 2023-07-11 ENCOUNTER — TELEPHONE (OUTPATIENT)
Dept: OBSTETRICS AND GYNECOLOGY | Facility: CLINIC | Age: 28
End: 2023-07-11
Payer: MEDICAID

## 2023-07-12 NOTE — TELEPHONE ENCOUNTER
----- Message from Janel Cerrato sent at 7/10/2023  7:40 AM CDT -----    ----- Message -----  From: Ronna Rabago MA  Sent: 7/6/2023   4:11 PM CDT  To: Janel Monique, I'm not sure about her because Alejandrina was suppose to schedule it and give her a call back.   ----- Message -----  From: Janel Cerrato  Sent: 7/6/2023   3:17 PM CDT  To: Dada CONNELL Staff    Patient called and requested a call back asap. She has left multiple messages.        Return phone call, no answer. Left vm for pt to call office to schedule IUD removal. Surgery will be scheduled for 08/09.

## 2023-07-28 RX ORDER — RIZATRIPTAN BENZOATE 10 MG/1
TABLET ORAL
COMMUNITY
Start: 2023-07-05 | End: 2024-02-05 | Stop reason: SDUPTHER

## 2023-07-31 ENCOUNTER — OFFICE VISIT (OUTPATIENT)
Dept: NEUROLOGY | Facility: CLINIC | Age: 28
End: 2023-07-31
Payer: MEDICAID

## 2023-07-31 VITALS
WEIGHT: 125 LBS | BODY MASS INDEX: 22.15 KG/M2 | HEIGHT: 63 IN | DIASTOLIC BLOOD PRESSURE: 58 MMHG | SYSTOLIC BLOOD PRESSURE: 99 MMHG | RESPIRATION RATE: 18 BRPM | OXYGEN SATURATION: 100 % | HEART RATE: 91 BPM

## 2023-07-31 DIAGNOSIS — G43.009 MIGRAINE WITHOUT AURA AND WITHOUT STATUS MIGRAINOSUS, NOT INTRACTABLE: Primary | ICD-10-CM

## 2023-07-31 PROCEDURE — 3008F PR BODY MASS INDEX (BMI) DOCUMENTED: ICD-10-PCS | Mod: ,,, | Performed by: PSYCHIATRY & NEUROLOGY

## 2023-07-31 PROCEDURE — 99214 OFFICE O/P EST MOD 30 MIN: CPT | Mod: PBBFAC | Performed by: PSYCHIATRY & NEUROLOGY

## 2023-07-31 PROCEDURE — 3008F BODY MASS INDEX DOCD: CPT | Mod: ,,, | Performed by: PSYCHIATRY & NEUROLOGY

## 2023-07-31 PROCEDURE — 3078F PR MOST RECENT DIASTOLIC BLOOD PRESSURE < 80 MM HG: ICD-10-PCS | Mod: ,,, | Performed by: PSYCHIATRY & NEUROLOGY

## 2023-07-31 PROCEDURE — 3078F DIAST BP <80 MM HG: CPT | Mod: ,,, | Performed by: PSYCHIATRY & NEUROLOGY

## 2023-07-31 PROCEDURE — 3074F SYST BP LT 130 MM HG: CPT | Mod: ,,, | Performed by: PSYCHIATRY & NEUROLOGY

## 2023-07-31 PROCEDURE — 3074F PR MOST RECENT SYSTOLIC BLOOD PRESSURE < 130 MM HG: ICD-10-PCS | Mod: ,,, | Performed by: PSYCHIATRY & NEUROLOGY

## 2023-07-31 PROCEDURE — 99214 PR OFFICE/OUTPT VISIT, EST, LEVL IV, 30-39 MIN: ICD-10-PCS | Mod: S$PBB,,, | Performed by: PSYCHIATRY & NEUROLOGY

## 2023-07-31 PROCEDURE — 99214 OFFICE O/P EST MOD 30 MIN: CPT | Mod: S$PBB,,, | Performed by: PSYCHIATRY & NEUROLOGY

## 2023-07-31 NOTE — PROGRESS NOTES
Subjective:       Patient ID: Martha Vazquez is a 28 y.o. female     Chief Complaint:    Chief Complaint   Patient presents with    Follow-up     Migraines          Allergies:  Lortab [hydrocodone-acetaminophen] and Pcn [penicillins]    Current Medications:    Outpatient Encounter Medications as of 7/31/2023   Medication Sig Dispense Refill    albuterol (ACCUNEB) 1.25 mg/3 mL Nebu       levonorgestreL (MIRENA) 21 mcg/24 hours (8 yrs) 52 mg IUD 1 each by Intrauterine route once.      naproxen (NAPROSYN) 500 MG tablet Take 1 tablet (500 mg total) by mouth 2 (two) times daily. 30 tablet 5    ondansetron (ZOFRAN-ODT) 4 MG TbDL Take 4 mg by mouth.      promethazine (PHENERGAN) 25 MG tablet Take by mouth.      rizatriptan (MAXALT) 10 MG tablet       TRULICITY 0.75 mg/0.5 mL pen injector Inject into the skin.      [DISCONTINUED] topiramate (TOPAMAX) 50 MG tablet Take 2 tablets (100 mg total) by mouth 2 (two) times daily. (Patient not taking: Reported on 6/5/2023) 360 tablet 3     No facility-administered encounter medications on file as of 7/31/2023.       History of Present Illness  27 yo WF w/ well controlled on current TOPAMAX 100 mg bid   Has not needed any abortive agents such as Maxalt prn but has helped in past          Past Medical History:   Diagnosis Date    39 weeks gestation of pregnancy 5/3/2021    Pregnant and not yet delivered 3/16/2021    Rh negative state in antepartum period 5/3/2021       Past Surgical History:   Procedure Laterality Date    tongue clipped      TUBAL LIGATION         Social History  Ms. Vazquez  reports that she has been smoking vaping with nicotine. She uses smokeless tobacco. She reports that she does not drink alcohol and does not use drugs.    Family History  Ms.'s Vazquez family history includes Hypertension in her father and paternal grandfather; Stroke in her father and paternal grandfather.    Review of Systems  Review of Systems   Neurological:  Positive for headaches.   All  "other systems reviewed and are negative.     Objective:   BP (!) 99/58 (BP Location: Left arm, Patient Position: Sitting, BP Method: Large (Manual))   Pulse 91   Resp 18   Ht 5' 3" (1.6 m)   Wt 56.7 kg (125 lb)   LMP  (LMP Unknown)   SpO2 100%   BMI 22.14 kg/m²    NEUROLOGICAL EXAMINATION:     MENTAL STATUS   Oriented to person, place, and time.   Level of consciousness: alert  Knowledge: good.     CRANIAL NERVES   Cranial nerves II through XII intact.     MOTOR EXAM     Strength   Strength 5/5 throughout.     GAIT AND COORDINATION     Gait  Gait: normal       Physical Exam  Vitals reviewed.   Constitutional:       Appearance: She is normal weight.   Neurological:      General: No focal deficit present.      Mental Status: She is alert and oriented to person, place, and time. Mental status is at baseline.      Cranial Nerves: Cranial nerves 2-12 are intact.      Motor: Motor strength is normal.     Gait: Gait is intact.          Assessment:     Migraine without aura and without status migrainosus, not intractable         Primary Diagnosis and ICD10  Migraine without aura and without status migrainosus, not intractable [G43.009]    Plan:     Patient Instructions   Cont TOPAMAX 100 mg twice daily  Maxalt prn   Avoid any triggers   F/u 6 months    Medications Discontinued During This Encounter   Medication Reason    topiramate (TOPAMAX) 50 MG tablet        Requested Prescriptions      No prescriptions requested or ordered in this encounter       "

## 2023-08-15 ENCOUNTER — PROCEDURE VISIT (OUTPATIENT)
Dept: OBSTETRICS AND GYNECOLOGY | Facility: CLINIC | Age: 28
End: 2023-08-15
Payer: MEDICAID

## 2023-08-15 VITALS
WEIGHT: 194 LBS | BODY MASS INDEX: 34.38 KG/M2 | DIASTOLIC BLOOD PRESSURE: 80 MMHG | SYSTOLIC BLOOD PRESSURE: 98 MMHG | HEIGHT: 63 IN

## 2023-08-15 DIAGNOSIS — Z30.432 ENCOUNTER FOR IUD REMOVAL: Primary | ICD-10-CM

## 2023-08-15 PROCEDURE — 58301 REMOVE INTRAUTERINE DEVICE: CPT | Mod: PBBFAC | Performed by: OBSTETRICS & GYNECOLOGY

## 2023-08-15 PROCEDURE — 58301 REMOVE INTRAUTERINE DEVICE: CPT | Mod: S$PBB,,, | Performed by: OBSTETRICS & GYNECOLOGY

## 2023-08-15 PROCEDURE — 58301 PR REMOVE, INTRAUTERINE DEVICE: ICD-10-PCS | Mod: S$PBB,,, | Performed by: OBSTETRICS & GYNECOLOGY

## 2023-08-16 NOTE — PROGRESS NOTES
Martha Almazaney female  for   Chief Complaint   Patient presents with    Procedure     Pt is here for IUD removal       OB History          3    Para   3    Term   3            AB        Living   3         SAB        IAB        Ectopic        Multiple   0    Live Births   3                  Past Medical History:   Diagnosis Date    39 weeks gestation of pregnancy 5/3/2021    Pregnant and not yet delivered 3/16/2021    Rh negative state in antepartum period 5/3/2021      Past Surgical History:   Procedure Laterality Date    tongue clipped      TUBAL LIGATION        Review of patient's allergies indicates:   Allergen Reactions    Lortab [hydrocodone-acetaminophen] Nausea And Vomiting    Pcn [penicillins] Rash             Physical exam:     General Appearance: healthy      Abdomen:Normal, benign.    Pelvic: Pelvic exam: normal external genitalia, vulva, vagina, cervix, uterus and adnexa, VULVA: normal appearing vulva with no masses, tenderness or lesions, CERVIX: normal appearing cervix without discharge or lesions, the IUD string was easily visualized.  It was grasped with a ring forceps and IUD was removed without difficulty,  UTERUS: uterus is normal size, shape, consistency and nontender, ADNEXA: normal adnexa in size, nontender and no masses, RECTAL:deferred normal rectal, no masses, guaiac negative stool obtained.     Extremity:normal    Skin: normal exam        Assessment:   Problem List Items Addressed This Visit    None  Visit Diagnoses       Encounter for IUD removal    -  Primary             Plan:  The patient tolerated the procedure well.

## 2023-08-22 ENCOUNTER — OFFICE VISIT (OUTPATIENT)
Dept: OBSTETRICS AND GYNECOLOGY | Facility: CLINIC | Age: 28
End: 2023-08-22
Payer: MEDICAID

## 2023-08-22 VITALS — WEIGHT: 118 LBS | HEIGHT: 63 IN | BODY MASS INDEX: 20.91 KG/M2

## 2023-08-22 DIAGNOSIS — R10.2 PELVIC PAIN: ICD-10-CM

## 2023-08-22 DIAGNOSIS — Z01.818 PRE-OP EXAM: Primary | ICD-10-CM

## 2023-08-22 DIAGNOSIS — N92.1 MENORRHAGIA WITH IRREGULAR CYCLE: Primary | ICD-10-CM

## 2023-08-22 PROCEDURE — 3008F BODY MASS INDEX DOCD: CPT | Mod: ,,, | Performed by: OBSTETRICS & GYNECOLOGY

## 2023-08-22 PROCEDURE — 99214 PR OFFICE/OUTPT VISIT, EST, LEVL IV, 30-39 MIN: ICD-10-PCS | Mod: S$PBB,,, | Performed by: OBSTETRICS & GYNECOLOGY

## 2023-08-22 PROCEDURE — 99213 OFFICE O/P EST LOW 20 MIN: CPT | Mod: PBBFAC | Performed by: OBSTETRICS & GYNECOLOGY

## 2023-08-22 PROCEDURE — 3008F PR BODY MASS INDEX (BMI) DOCUMENTED: ICD-10-PCS | Mod: ,,, | Performed by: OBSTETRICS & GYNECOLOGY

## 2023-08-22 PROCEDURE — 99214 OFFICE O/P EST MOD 30 MIN: CPT | Mod: S$PBB,,, | Performed by: OBSTETRICS & GYNECOLOGY

## 2023-08-22 RX ORDER — PROMETHAZINE HYDROCHLORIDE 25 MG/1
25 SUPPOSITORY RECTAL EVERY 6 HOURS PRN
Status: CANCELLED | OUTPATIENT
Start: 2023-08-22

## 2023-08-22 RX ORDER — SODIUM CHLORIDE 9 MG/ML
INJECTION, SOLUTION INTRAVENOUS CONTINUOUS
Status: CANCELLED | OUTPATIENT
Start: 2023-08-22

## 2023-08-22 NOTE — H&P (VIEW-ONLY)
"History & Physical    SUBJECTIVE:     History of Present Illness:  Patient is a 28 y.o. female presents for robotic hysterectomy, possible bilateral salpingo-oophorectomy, cystoscopy.    No chief complaint on file.      Review of patient's allergies indicates:   Allergen Reactions    Lortab [hydrocodone-acetaminophen] Nausea And Vomiting    Pcn [penicillins] Rash       Current Outpatient Medications   Medication Sig Dispense Refill    albuterol (ACCUNEB) 1.25 mg/3 mL Nebu       levonorgestreL (MIRENA) 21 mcg/24 hours (8 yrs) 52 mg IUD 1 each by Intrauterine route once.      naproxen (NAPROSYN) 500 MG tablet Take 1 tablet (500 mg total) by mouth 2 (two) times daily. 30 tablet 5    ondansetron (ZOFRAN-ODT) 4 MG TbDL Take 4 mg by mouth.      promethazine (PHENERGAN) 25 MG tablet Take by mouth.      rizatriptan (MAXALT) 10 MG tablet       TRULICITY 0.75 mg/0.5 mL pen injector Inject into the skin.       No current facility-administered medications for this visit.       Past Medical History:   Diagnosis Date    39 weeks gestation of pregnancy 5/3/2021    Pregnant and not yet delivered 3/16/2021    Rh negative state in antepartum period 5/3/2021     Past Surgical History:   Procedure Laterality Date    tongue clipped      TUBAL LIGATION       Family History   Problem Relation Age of Onset    Hypertension Paternal Grandfather     Stroke Paternal Grandfather     Hypertension Father     Stroke Father     Breast cancer Neg Hx     Colon cancer Neg Hx     Miscarriages / Stillbirths Neg Hx      Social History     Tobacco Use    Smoking status: Every Day     Current packs/day: 0.00     Types: Vaping with nicotine    Smokeless tobacco: Current   Substance Use Topics    Alcohol use: Never    Drug use: Never        Review of Systems:  Review of Systems   Constitutional:  Negative for fever.   Genitourinary:  Negative for dysuria.       OBJECTIVE:     Vital Signs (Most Recent)     5' 3" (1.6 m)  53.5 kg (118 lb)     Physical " Exam:  Physical Exam  Vitals reviewed.   Cardiovascular:      Rate and Rhythm: Normal rate and regular rhythm.   Pulmonary:      Effort: Pulmonary effort is normal.      Breath sounds: Normal breath sounds.   Abdominal:      Palpations: Abdomen is soft.   Genitourinary:     General: Normal vulva.      Rectum: Normal.      Comments: Vagina:  Normal  Cervix:  Clean  Uterus:  Mildly enlarged and boggy  Adnexa:  No masses or tenderness  Skin:     General: Skin is warm.   Neurological:      Mental Status: She is alert.   Psychiatric:         Mood and Affect: Mood normal.         Laboratory  Reviewed    Diagnostic Results:  Reviewed    ASSESSMENT/PLAN:     Heavy vaginal bleeding, severe dysmenorrhea    PLAN:Plan     Robotic hysterectomy, possible bilateral salpingo oophorectomy, cystoscopy

## 2023-08-23 ENCOUNTER — ANESTHESIA EVENT (OUTPATIENT)
Dept: SURGERY | Facility: HOSPITAL | Age: 28
End: 2023-08-23
Payer: MEDICAID

## 2023-08-23 ENCOUNTER — HOSPITAL ENCOUNTER (OUTPATIENT)
Facility: HOSPITAL | Age: 28
Discharge: HOME | End: 2023-08-23
Attending: OBSTETRICS & GYNECOLOGY | Admitting: OBSTETRICS & GYNECOLOGY
Payer: MEDICAID

## 2023-08-23 ENCOUNTER — ANESTHESIA (OUTPATIENT)
Dept: SURGERY | Facility: HOSPITAL | Age: 28
End: 2023-08-23
Payer: MEDICAID

## 2023-08-23 VITALS
DIASTOLIC BLOOD PRESSURE: 68 MMHG | SYSTOLIC BLOOD PRESSURE: 102 MMHG | OXYGEN SATURATION: 97 % | WEIGHT: 120 LBS | RESPIRATION RATE: 16 BRPM | TEMPERATURE: 98 F | HEIGHT: 63 IN | HEART RATE: 81 BPM | BODY MASS INDEX: 21.26 KG/M2

## 2023-08-23 DIAGNOSIS — N92.1 MENORRHAGIA WITH IRREGULAR CYCLE: ICD-10-CM

## 2023-08-23 LAB
HCT VFR BLD AUTO: 32.8 % (ref 38–47)
HGB BLD-MCNC: 10.7 G/DL (ref 12–16)

## 2023-08-23 PROCEDURE — 63600175 PHARM REV CODE 636 W HCPCS: Performed by: NURSE ANESTHETIST, CERTIFIED REGISTERED

## 2023-08-23 PROCEDURE — 63600175 PHARM REV CODE 636 W HCPCS: Performed by: ANESTHESIOLOGY

## 2023-08-23 PROCEDURE — C2628 CATHETER, OCCLUSION: HCPCS | Performed by: OBSTETRICS & GYNECOLOGY

## 2023-08-23 PROCEDURE — 27201423 OPTIME MED/SURG SUP & DEVICES STERILE SUPPLY: Performed by: OBSTETRICS & GYNECOLOGY

## 2023-08-23 PROCEDURE — 27000509 HC TUBE SALEM SUMP ANY SIZE: Performed by: NURSE ANESTHETIST, CERTIFIED REGISTERED

## 2023-08-23 PROCEDURE — 37000009 HC ANESTHESIA EA ADD 15 MINS: Performed by: OBSTETRICS & GYNECOLOGY

## 2023-08-23 PROCEDURE — 36000713 HC OR TIME LEV V EA ADD 15 MIN: Performed by: OBSTETRICS & GYNECOLOGY

## 2023-08-23 PROCEDURE — 27000689 HC BLADE LARYNGOSCOPE ANY SIZE: Performed by: NURSE ANESTHETIST, CERTIFIED REGISTERED

## 2023-08-23 PROCEDURE — 27000165 HC TUBE, ETT CUFFED: Performed by: NURSE ANESTHETIST, CERTIFIED REGISTERED

## 2023-08-23 PROCEDURE — D9220A PRA ANESTHESIA: Mod: ANES,,, | Performed by: ANESTHESIOLOGY

## 2023-08-23 PROCEDURE — 25000003 PHARM REV CODE 250: Performed by: ANESTHESIOLOGY

## 2023-08-23 PROCEDURE — 36000712 HC OR TIME LEV V 1ST 15 MIN: Performed by: OBSTETRICS & GYNECOLOGY

## 2023-08-23 PROCEDURE — 27000655: Performed by: NURSE ANESTHETIST, CERTIFIED REGISTERED

## 2023-08-23 PROCEDURE — 71000015 HC POSTOP RECOV 1ST HR: Performed by: OBSTETRICS & GYNECOLOGY

## 2023-08-23 PROCEDURE — D9220A PRA ANESTHESIA: ICD-10-PCS | Mod: CRNA,,, | Performed by: NURSE ANESTHETIST, CERTIFIED REGISTERED

## 2023-08-23 PROCEDURE — D9220A PRA ANESTHESIA: Mod: CRNA,,, | Performed by: NURSE ANESTHETIST, CERTIFIED REGISTERED

## 2023-08-23 PROCEDURE — 63600175 PHARM REV CODE 636 W HCPCS: Performed by: OBSTETRICS & GYNECOLOGY

## 2023-08-23 PROCEDURE — 27000716 HC OXISENSOR PROBE, ANY SIZE: Performed by: NURSE ANESTHETIST, CERTIFIED REGISTERED

## 2023-08-23 PROCEDURE — 37000008 HC ANESTHESIA 1ST 15 MINUTES: Performed by: OBSTETRICS & GYNECOLOGY

## 2023-08-23 PROCEDURE — 27000510 HC BLANKET BAIR HUGGER ANY SIZE: Performed by: NURSE ANESTHETIST, CERTIFIED REGISTERED

## 2023-08-23 PROCEDURE — 88307 SURGICAL PATHOLOGY: ICD-10-PCS | Mod: 26,,, | Performed by: PATHOLOGY

## 2023-08-23 PROCEDURE — 85014 HEMATOCRIT: CPT | Performed by: OBSTETRICS & GYNECOLOGY

## 2023-08-23 PROCEDURE — 71000033 HC RECOVERY, INTIAL HOUR: Performed by: OBSTETRICS & GYNECOLOGY

## 2023-08-23 PROCEDURE — 27200700 HC TUBE, ENDOTRACHEAL NASAL RAE: Performed by: NURSE ANESTHETIST, CERTIFIED REGISTERED

## 2023-08-23 PROCEDURE — 64488 TAP BLOCK BI INJECTION: CPT | Mod: 59 | Performed by: ANESTHESIOLOGY

## 2023-08-23 PROCEDURE — 71000016 HC POSTOP RECOV ADDL HR: Performed by: OBSTETRICS & GYNECOLOGY

## 2023-08-23 PROCEDURE — 64488 PERIPHERAL BLOCK: ICD-10-PCS | Mod: 59,,, | Performed by: ANESTHESIOLOGY

## 2023-08-23 PROCEDURE — 58571 TLH W/T/O 250 G OR LESS: CPT | Mod: ,,, | Performed by: OBSTETRICS & GYNECOLOGY

## 2023-08-23 PROCEDURE — 25000003 PHARM REV CODE 250: Performed by: OBSTETRICS & GYNECOLOGY

## 2023-08-23 PROCEDURE — 27202344 HC EYESHIELD: Performed by: NURSE ANESTHETIST, CERTIFIED REGISTERED

## 2023-08-23 PROCEDURE — 25000003 PHARM REV CODE 250: Performed by: NURSE ANESTHETIST, CERTIFIED REGISTERED

## 2023-08-23 PROCEDURE — D9220A PRA ANESTHESIA: ICD-10-PCS | Mod: ANES,,, | Performed by: ANESTHESIOLOGY

## 2023-08-23 PROCEDURE — 88307 TISSUE EXAM BY PATHOLOGIST: CPT | Mod: TC,SUR | Performed by: OBSTETRICS & GYNECOLOGY

## 2023-08-23 PROCEDURE — 58571 PR LAPAROSCOPY W TOT HYSTERECTUTERUS <=250 GRAM  W TUBE/OVARY: ICD-10-PCS | Mod: ,,, | Performed by: OBSTETRICS & GYNECOLOGY

## 2023-08-23 PROCEDURE — 64488 TAP BLOCK BI INJECTION: CPT | Mod: 59,,, | Performed by: ANESTHESIOLOGY

## 2023-08-23 PROCEDURE — 88307 TISSUE EXAM BY PATHOLOGIST: CPT | Mod: 26,,, | Performed by: PATHOLOGY

## 2023-08-23 RX ORDER — BUPIVACAINE HYDROCHLORIDE 2.5 MG/ML
INJECTION, SOLUTION EPIDURAL; INFILTRATION; INTRACAUDAL
Status: DISCONTINUED | OUTPATIENT
Start: 2023-08-23 | End: 2023-08-23 | Stop reason: HOSPADM

## 2023-08-23 RX ORDER — SODIUM CHLORIDE 9 MG/ML
INJECTION, SOLUTION INTRAVENOUS CONTINUOUS
Status: DISCONTINUED | OUTPATIENT
Start: 2023-08-23 | End: 2023-08-23 | Stop reason: HOSPADM

## 2023-08-23 RX ORDER — DIPHENHYDRAMINE HYDROCHLORIDE 50 MG/ML
25 INJECTION INTRAMUSCULAR; INTRAVENOUS EVERY 6 HOURS PRN
Status: DISCONTINUED | OUTPATIENT
Start: 2023-08-23 | End: 2023-08-23 | Stop reason: HOSPADM

## 2023-08-23 RX ORDER — PROMETHAZINE HYDROCHLORIDE 25 MG/1
25 SUPPOSITORY RECTAL EVERY 6 HOURS PRN
Status: DISCONTINUED | OUTPATIENT
Start: 2023-08-23 | End: 2023-08-23 | Stop reason: HOSPADM

## 2023-08-23 RX ORDER — MIDAZOLAM HYDROCHLORIDE 1 MG/ML
INJECTION INTRAMUSCULAR; INTRAVENOUS
Status: DISCONTINUED | OUTPATIENT
Start: 2023-08-23 | End: 2023-08-23

## 2023-08-23 RX ORDER — KETOROLAC TROMETHAMINE 30 MG/ML
INJECTION, SOLUTION INTRAMUSCULAR; INTRAVENOUS
Status: DISCONTINUED | OUTPATIENT
Start: 2023-08-23 | End: 2023-08-23

## 2023-08-23 RX ORDER — DEXAMETHASONE SODIUM PHOSPHATE 4 MG/ML
INJECTION, SOLUTION INTRA-ARTICULAR; INTRALESIONAL; INTRAMUSCULAR; INTRAVENOUS; SOFT TISSUE
Status: DISCONTINUED | OUTPATIENT
Start: 2023-08-23 | End: 2023-08-23

## 2023-08-23 RX ORDER — HYDROMORPHONE HYDROCHLORIDE 2 MG/ML
0.5 INJECTION, SOLUTION INTRAMUSCULAR; INTRAVENOUS; SUBCUTANEOUS EVERY 5 MIN PRN
Status: DISCONTINUED | OUTPATIENT
Start: 2023-08-23 | End: 2023-08-23 | Stop reason: HOSPADM

## 2023-08-23 RX ORDER — DIMENHYDRINATE 50 MG
50 TABLET ORAL ONCE
Status: COMPLETED | OUTPATIENT
Start: 2023-08-23 | End: 2023-08-23

## 2023-08-23 RX ORDER — MORPHINE SULFATE 10 MG/ML
4 INJECTION INTRAMUSCULAR; INTRAVENOUS; SUBCUTANEOUS EVERY 5 MIN PRN
Status: DISCONTINUED | OUTPATIENT
Start: 2023-08-23 | End: 2023-08-23 | Stop reason: HOSPADM

## 2023-08-23 RX ORDER — PROMETHAZINE HYDROCHLORIDE 25 MG/1
25 TABLET ORAL ONCE
Status: COMPLETED | OUTPATIENT
Start: 2023-08-23 | End: 2023-08-23

## 2023-08-23 RX ORDER — DIAZEPAM 5 MG/1
10 TABLET ORAL ONCE
Status: COMPLETED | OUTPATIENT
Start: 2023-08-23 | End: 2023-08-23

## 2023-08-23 RX ORDER — FUROSEMIDE 10 MG/ML
INJECTION INTRAMUSCULAR; INTRAVENOUS
Status: DISCONTINUED | OUTPATIENT
Start: 2023-08-23 | End: 2023-08-23

## 2023-08-23 RX ORDER — NAPROXEN 250 MG/1
500 TABLET ORAL EVERY 12 HOURS PRN
Status: DISCONTINUED | OUTPATIENT
Start: 2023-08-23 | End: 2023-08-23 | Stop reason: HOSPADM

## 2023-08-23 RX ORDER — ROCURONIUM BROMIDE 10 MG/ML
INJECTION, SOLUTION INTRAVENOUS
Status: DISCONTINUED | OUTPATIENT
Start: 2023-08-23 | End: 2023-08-23

## 2023-08-23 RX ORDER — TOPIRAMATE 50 MG/1
TABLET, FILM COATED ORAL
COMMUNITY
Start: 2023-08-16 | End: 2024-02-05 | Stop reason: SDUPTHER

## 2023-08-23 RX ORDER — MEPERIDINE HYDROCHLORIDE 25 MG/ML
25 INJECTION INTRAMUSCULAR; INTRAVENOUS; SUBCUTANEOUS EVERY 10 MIN PRN
Status: DISCONTINUED | OUTPATIENT
Start: 2023-08-23 | End: 2023-08-23 | Stop reason: HOSPADM

## 2023-08-23 RX ORDER — LIDOCAINE HYDROCHLORIDE 20 MG/ML
INJECTION, SOLUTION EPIDURAL; INFILTRATION; INTRACAUDAL; PERINEURAL
Status: DISCONTINUED | OUTPATIENT
Start: 2023-08-23 | End: 2023-08-23

## 2023-08-23 RX ORDER — MORPHINE SULFATE 8 MG/ML
INJECTION INTRAMUSCULAR; INTRAVENOUS; SUBCUTANEOUS
Status: DISCONTINUED | OUTPATIENT
Start: 2023-08-23 | End: 2023-08-23

## 2023-08-23 RX ORDER — IPRATROPIUM BROMIDE AND ALBUTEROL SULFATE 2.5; .5 MG/3ML; MG/3ML
3 SOLUTION RESPIRATORY (INHALATION) ONCE
Status: DISCONTINUED | OUTPATIENT
Start: 2023-08-23 | End: 2023-08-23 | Stop reason: HOSPADM

## 2023-08-23 RX ORDER — DIPHENHYDRAMINE HCL 25 MG
25 CAPSULE ORAL EVERY 4 HOURS PRN
Status: DISCONTINUED | OUTPATIENT
Start: 2023-08-23 | End: 2023-08-23 | Stop reason: HOSPADM

## 2023-08-23 RX ORDER — KETOROLAC TROMETHAMINE 30 MG/ML
15 INJECTION, SOLUTION INTRAMUSCULAR; INTRAVENOUS EVERY 6 HOURS PRN
Status: DISCONTINUED | OUTPATIENT
Start: 2023-08-23 | End: 2023-08-23 | Stop reason: HOSPADM

## 2023-08-23 RX ORDER — CLINDAMYCIN PHOSPHATE 900 MG/50ML
900 INJECTION, SOLUTION INTRAVENOUS
Status: COMPLETED | OUTPATIENT
Start: 2023-08-23 | End: 2023-08-23

## 2023-08-23 RX ORDER — PROPOFOL 10 MG/ML
VIAL (ML) INTRAVENOUS
Status: DISCONTINUED | OUTPATIENT
Start: 2023-08-23 | End: 2023-08-23

## 2023-08-23 RX ORDER — ONDANSETRON 4 MG/1
4 TABLET, FILM COATED ORAL ONCE
Status: COMPLETED | OUTPATIENT
Start: 2023-08-23 | End: 2023-08-23

## 2023-08-23 RX ORDER — ONDANSETRON 2 MG/ML
INJECTION INTRAMUSCULAR; INTRAVENOUS
Status: DISCONTINUED | OUTPATIENT
Start: 2023-08-23 | End: 2023-08-23

## 2023-08-23 RX ORDER — PROCHLORPERAZINE EDISYLATE 5 MG/ML
5 INJECTION INTRAMUSCULAR; INTRAVENOUS EVERY 6 HOURS PRN
Status: DISCONTINUED | OUTPATIENT
Start: 2023-08-23 | End: 2023-08-23 | Stop reason: HOSPADM

## 2023-08-23 RX ORDER — FLUORESCEIN 500 MG/ML
INJECTION INTRAVENOUS
Status: DISCONTINUED | OUTPATIENT
Start: 2023-08-23 | End: 2023-08-23

## 2023-08-23 RX ORDER — ROPIVACAINE HYDROCHLORIDE 5 MG/ML
INJECTION, SOLUTION EPIDURAL; INFILTRATION; PERINEURAL
Status: COMPLETED | OUTPATIENT
Start: 2023-08-23 | End: 2023-08-23

## 2023-08-23 RX ORDER — IBUPROFEN 800 MG/1
800 TABLET ORAL 3 TIMES DAILY
Qty: 30 TABLET | Refills: 2 | Status: SHIPPED | OUTPATIENT
Start: 2023-08-23 | End: 2024-02-05

## 2023-08-23 RX ORDER — FENTANYL CITRATE 50 UG/ML
INJECTION, SOLUTION INTRAMUSCULAR; INTRAVENOUS
Status: DISCONTINUED | OUTPATIENT
Start: 2023-08-23 | End: 2023-08-23

## 2023-08-23 RX ORDER — PHENYLEPHRINE HYDROCHLORIDE 10 MG/ML
INJECTION INTRAVENOUS
Status: DISCONTINUED | OUTPATIENT
Start: 2023-08-23 | End: 2023-08-23

## 2023-08-23 RX ORDER — ONDANSETRON 4 MG/1
8 TABLET, ORALLY DISINTEGRATING ORAL EVERY 8 HOURS PRN
Status: DISCONTINUED | OUTPATIENT
Start: 2023-08-23 | End: 2023-08-23 | Stop reason: HOSPADM

## 2023-08-23 RX ORDER — ONDANSETRON 2 MG/ML
4 INJECTION INTRAMUSCULAR; INTRAVENOUS DAILY PRN
Status: DISCONTINUED | OUTPATIENT
Start: 2023-08-23 | End: 2023-08-23 | Stop reason: HOSPADM

## 2023-08-23 RX ORDER — DIPHENHYDRAMINE HYDROCHLORIDE 50 MG/ML
25 INJECTION INTRAMUSCULAR; INTRAVENOUS EVERY 4 HOURS PRN
Status: DISCONTINUED | OUTPATIENT
Start: 2023-08-23 | End: 2023-08-23 | Stop reason: HOSPADM

## 2023-08-23 RX ADMIN — MORPHINE SULFATE 4 MG: 8 INJECTION INTRAVENOUS at 09:08

## 2023-08-23 RX ADMIN — CLINDAMYCIN IN 5 PERCENT DEXTROSE 900 MG: 18 INJECTION, SOLUTION INTRAVENOUS at 07:08

## 2023-08-23 RX ADMIN — ROCURONIUM BROMIDE 50 MG: 10 INJECTION, SOLUTION INTRAVENOUS at 07:08

## 2023-08-23 RX ADMIN — FENTANYL CITRATE 100 MCG: 50 INJECTION INTRAMUSCULAR; INTRAVENOUS at 07:08

## 2023-08-23 RX ADMIN — LIDOCAINE HYDROCHLORIDE 75 MG: 20 INJECTION, SOLUTION INTRAVENOUS at 07:08

## 2023-08-23 RX ADMIN — SODIUM CHLORIDE: 9 INJECTION, SOLUTION INTRAVENOUS at 12:08

## 2023-08-23 RX ADMIN — FUROSEMIDE 10 MG: 10 INJECTION, SOLUTION INTRAMUSCULAR; INTRAVENOUS at 08:08

## 2023-08-23 RX ADMIN — PHENYLEPHRINE HYDROCHLORIDE 50 MCG: 10 INJECTION INTRAVENOUS at 07:08

## 2023-08-23 RX ADMIN — DIMENHYDRINATE 50 MG: 50 TABLET ORAL at 06:08

## 2023-08-23 RX ADMIN — PROMETHAZINE HYDROCHLORIDE 25 MG: 25 TABLET ORAL at 01:08

## 2023-08-23 RX ADMIN — KETOROLAC TROMETHAMINE 15 MG: 30 INJECTION INTRAMUSCULAR; INTRAVENOUS at 11:08

## 2023-08-23 RX ADMIN — DIAZEPAM 10 MG: 5 TABLET ORAL at 06:08

## 2023-08-23 RX ADMIN — ONDANSETRON HYDROCHLORIDE 4 MG: 4 TABLET, FILM COATED ORAL at 06:08

## 2023-08-23 RX ADMIN — MIDAZOLAM 2 MG: 1 INJECTION INTRAMUSCULAR; INTRAVENOUS at 07:08

## 2023-08-23 RX ADMIN — SUGAMMADEX 200 MG: 100 INJECTION, SOLUTION INTRAVENOUS at 09:08

## 2023-08-23 RX ADMIN — PROPOFOL 150 MG: 10 INJECTION, EMULSION INTRAVENOUS at 07:08

## 2023-08-23 RX ADMIN — ONDANSETRON 8 MG: 2 INJECTION INTRAMUSCULAR; INTRAVENOUS at 07:08

## 2023-08-23 RX ADMIN — PHENYLEPHRINE HYDROCHLORIDE 50 MCG: 10 INJECTION INTRAVENOUS at 08:08

## 2023-08-23 RX ADMIN — ROPIVACAINE HYDROCHLORIDE 30 ML: 5 INJECTION, SOLUTION EPIDURAL; INFILTRATION; PERINEURAL at 09:08

## 2023-08-23 RX ADMIN — SODIUM CHLORIDE: 9 INJECTION, SOLUTION INTRAVENOUS at 06:08

## 2023-08-23 RX ADMIN — FLUORESCEIN SODIUM 100 MG: 100 INJECTION INTRAVENOUS at 09:08

## 2023-08-23 RX ADMIN — PHENYLEPHRINE HYDROCHLORIDE 100 MCG: 10 INJECTION INTRAVENOUS at 08:08

## 2023-08-23 RX ADMIN — DEXAMETHASONE SODIUM PHOSPHATE 8 MG: 4 INJECTION, SOLUTION INTRA-ARTICULAR; INTRALESIONAL; INTRAMUSCULAR; INTRAVENOUS; SOFT TISSUE at 07:08

## 2023-08-23 RX ADMIN — KETOROLAC TROMETHAMINE 60 MG: 30 INJECTION, SOLUTION INTRAMUSCULAR at 09:08

## 2023-08-23 RX ADMIN — ONDANSETRON HYDROCHLORIDE 4 MG: 2 SOLUTION INTRAMUSCULAR; INTRAVENOUS at 12:08

## 2023-08-23 RX ADMIN — PHENYLEPHRINE HYDROCHLORIDE 100 MCG: 10 INJECTION INTRAVENOUS at 07:08

## 2023-08-23 RX ADMIN — MEPERIDINE HYDROCHLORIDE 25 MG: 25 INJECTION INTRAMUSCULAR; INTRAVENOUS; SUBCUTANEOUS at 10:08

## 2023-08-23 NOTE — ANESTHESIA PROCEDURE NOTES
Peripheral Block    Patient location during procedure: pre-op   Block not for primary anesthetic.  Reason for block: at surgeon's request and post-op pain management   Post-op Pain Location: abdominal wall pain   Start time: 8/23/2023 9:42 AM  Timeout: 8/23/2023 9:41 AM   End time: 8/23/2023 9:46 AM    Staffing  Authorizing Provider: Juan Finn MD  Performing Provider: Rubén Nguyen MD    Staffing  Performed by: Rubén Nguyen MD  Authorized by: Juan Finn MD    Preanesthetic Checklist  Completed: patient identified, IV checked, site marked, risks and benefits discussed, surgical consent, monitors and equipment checked, pre-op evaluation and timeout performed  Peripheral Block  Patient position: supine  Prep: ChloraPrep  Patient monitoring: cardiac monitor, heart rate, continuous pulse ox, continuous capnometry and frequent blood pressure checks  Block type: TAP  Laterality: bilateral  Injection technique: single shot  Needle  Needle type: Tuohy   Needle gauge: 21 G  Needle length: 4 in  Needle localization: ultrasound guidance   -ultrasound image captured on disc.  Assessment  Injection assessment: negative aspiration, negative parasthesia and local visualized surrounding nerve  Paresthesia pain: none  Heart rate change: no  Slow fractionated injection: yes    Medications:    Medications: ROPIvacaine (NAROPIN) injection 0.5% - Perineural   30 mL - 8/23/2023 9:48:00 AM    Additional Notes  VSS.  [facility] RN monitoring vitals throughout procedure.  Patient tolerated procedure well.

## 2023-08-23 NOTE — ANESTHESIA PREPROCEDURE EVALUATION
08/23/2023  Martha Vazquez is a 28 y.o., female.      Pre-op Assessment    I have reviewed the Patient Summary Reports.     I have reviewed the Nursing Notes. I have reviewed the NPO Status.   I have reviewed the Medications.     Review of Systems  Anesthesia Hx:  Denies Family Hx of Anesthesia complications.  Personal Hx of Anesthesia complications, Post-Operative Nausea/Vomiting   Social:  No Alcohol Use, Vaping    Hematology/Oncology:  Hematology Normal   Oncology Normal     EENT/Dental:EENT/Dental Normal   Cardiovascular:  Cardiovascular Normal     Pulmonary:  Pulmonary Normal    Renal/:  Renal/ Normal     Hepatic/GI:  Hepatic/GI Normal    Musculoskeletal:  Musculoskeletal Normal    Neurological:   Headaches    Endocrine:  Endocrine Normal    Dermatological:  Skin Normal    Psych:  Psychiatric Normal           Physical Exam  General: Well nourished, Cooperative, Alert and Oriented    Airway:  Mallampati: II / II  Mouth Opening: Normal  TM Distance: Normal  Neck ROM: Normal ROM    Dental:  Intact    Chest/Lungs:  Clear to auscultation    Heart:  Rate: Normal  Rhythm: Regular Rhythm  Sounds: Normal        Chemistry        Component Value Date/Time     08/22/2023 0741    K 4.1 08/22/2023 0741     (H) 08/22/2023 0741    CO2 23 08/22/2023 0741    BUN 16 08/22/2023 0741    CREATININE 0.69 08/22/2023 0741     (H) 08/22/2023 0741        Component Value Date/Time    CALCIUM 8.9 08/22/2023 0741    ALKPHOS 58 08/22/2023 0741    AST 14 (L) 08/22/2023 0741    ALT 29 08/22/2023 0741    BILITOT 0.3 08/22/2023 0741    ESTGFRAFRICA 239 02/18/2021 1156    EGFRNONAA 132 02/17/2022 0000        Lab Results   Component Value Date    WBC 6.54 08/22/2023    HGB 12.4 08/22/2023    HCT 38.5 08/22/2023     08/22/2023     No results found for this or any previous visit.      Anesthesia Plan  Type of  Anesthesia, risks & benefits discussed:    Anesthesia Type: Gen ETT  Intra-op Monitoring Plan: Standard ASA Monitors  Post Op Pain Control Plan: multimodal analgesia  Induction:  IV  Airway Plan: Direct  Informed Consent: Informed consent signed with the Patient and all parties understand the risks and agree with anesthesia plan.  All questions answered.   ASA Score: 1  Day of Surgery Review of History & Physical: H&P Update referred to the surgeon/provider.I have interviewed and examined the patient. I have reviewed the patient's H&P dated: There are no significant changes.     Ready For Surgery From Anesthesia Perspective.     .

## 2023-08-23 NOTE — ANESTHESIA POSTPROCEDURE EVALUATION
Anesthesia Post Evaluation    Patient: Martha Vazquez    Procedure(s) Performed: Procedure(s) (LRB):  XI ROBOTIC HYSTERECTOMY (N/A)  CYSTOSCOPY (N/A)    Final Anesthesia Type: general      Patient location during evaluation: PACU  Patient participation: Yes- Able to Participate  Level of consciousness: awake and alert  Post-procedure vital signs: reviewed and stable  Pain management: adequate  Airway patency: patent  ARISTIDES mitigation strategies: Multimodal analgesia and Use of major conduction anesthesia (spinal/epidural) or peripheral nerve block  PONV status at discharge: No PONV  Anesthetic complications: no      Cardiovascular status: blood pressure returned to baseline  Respiratory status: unassisted  Hydration status: euvolemic  Follow-up not needed.          Vitals Value Taken Time   /72 08/23/23 1430   Temp 36.4 °C (97.6 °F) 08/23/23 0955   Pulse 97 08/23/23 1440   Resp 16 08/23/23 1130   SpO2 97 % 08/23/23 1440   Vitals shown include unvalidated device data.      Event Time   Out of Recovery 10:37:00         Pain/Comfort Score: Pain Rating Prior to Med Admin: 7 (8/23/2023 11:37 AM)  Comfort Score: 9 (8/23/2023 10:41 AM)

## 2023-08-23 NOTE — DISCHARGE SUMMARY
[facility] - Periop Services  Discharge Note  Short Stay    Procedure(s) (LRB):  XI ROBOTIC HYSTERECTOMY (N/A)  CYSTOSCOPY (N/A)      OUTCOME: Patient tolerated treatment/procedure well without complication and is now ready for discharge.    DISPOSITION: Home or Self Care    FINAL DIAGNOSIS:  Heavy heavy vaginal bleeding    FOLLOWUP: In clinic    DISCHARGE INSTRUCTIONS:  No discharge procedures on file.     TIME SPENT ON DISCHARGE:  20 minutes

## 2023-08-23 NOTE — TRANSFER OF CARE
"Anesthesia Transfer of Care Note    Patient: Martha Vazquez    Procedure(s) Performed: Procedure(s) (LRB):  XI ROBOTIC HYSTERECTOMY (N/A)  CYSTOSCOPY (N/A)    Patient location: PACU    Anesthesia Type: general and regional    Transport from OR: Transported from OR on 6-10 L/min O2 by face mask with adequate spontaneous ventilation    Post pain: adequate analgesia    Post assessment: no apparent anesthetic complications and tolerated procedure well    Post vital signs: stable    Level of consciousness: responds to stimulation and awake    Nausea/Vomiting: no nausea/vomiting    Complications: none    Transfer of care protocol was followed      Last vitals:   Visit Vitals  BP (!) 98/49 (BP Location: Right arm, Patient Position: Lying)   Pulse 81   Temp 36.4 °C (97.6 °F) (Oral)   Resp 11   Ht 5' 3" (1.6 m)   Wt 54.4 kg (120 lb)   SpO2 100%   Breastfeeding No   BMI 21.26 kg/m²     "

## 2023-08-23 NOTE — OP NOTE
[facility] - Periop Services  Surgery Department  Operative Note    SUMMARY     Date of Procedure: 8/23/2023     Procedure: Procedure(s) (LRB):  XI ROBOTIC HYSTERECTOMY (N/A)  CYSTOSCOPY (N/A)     Surgeon(s) and Role:     * Bin Garland MD - Primary    Assisting Surgeon: None    Pre-Operative Diagnosis: Menorrhagia with irregular cycle [N92.1]    Post-Operative Diagnosis: Post-Op Diagnosis Codes:     * Menorrhagia with irregular cycle [N92.1]    Anesthesia: General    Operative Findings (including complications, if any):  Enlarged, boggy uterus    Description of Technical Procedures:  None    Significant Surgical Tasks Conducted by the Assistant(s), if Applicable:  None    Estimated Blood Loss (EBL): 20 mL           Implants: * No implants in log *    Specimens:   Specimen (24h ago, onward)       Start     Ordered    08/23/23 0856  Surgical Pathology  RELEASE UPON ORDERING         08/23/23 0856                            Condition: Good    Disposition: PACU - hemodynamically stable.    Attestation: I was present and scrubbed for the entire procedure.

## 2023-08-23 NOTE — OR NURSING
0951 Rec'd pt to PACU asleep with no distress noted, respirations even and unlabored. VSS. Abd lap sites x4 C/D/I, scant amount of blood noted to mary pad. Obrien to gravity patent, secure, intact with no kinks or obstructions noted. No needs at this time. Will continue to monitor.     1015 Obrien catheter removed. 10ml saline aspirated from bulb, catheter removed in one continuous motion. Catheter intact, pt tolerated well.     1021 Tremors noted. IV demerol given, see MAR for admin.     1040 Pt to ASC 18 drowsy but arousable to verbal stimuli. No signs of distress noted, respirations even and unlabored. Family at bedside. Bedside report given to BERNY Corea RN. Abd lap sites x4 C/D/I, scant amount of blood noted to mary pad. Denies needs at this time. BP 98/54, P 78, R 16, O2 100% RA.

## 2023-08-24 LAB
ESTROGEN SERPL-MCNC: NORMAL PG/ML
INSULIN SERPL-ACNC: NORMAL U[IU]/ML
LAB AP GROSS DESCRIPTION: NORMAL
LAB AP LABORATORY NOTES: NORMAL
T3RU NFR SERPL: NORMAL %

## 2023-08-24 NOTE — OP NOTE
Name: Martha Vazquez   MRN: [id number]      [facility]  [id number] [id number]    Date:8/23/23    Operative Report    PREOPERATIVE DIAGNOSIS:  Heavy vaginal bleeding, severe dysmenorrhea    POSTOPERATIVE DIAGNOSIS:  Same    OPERATION: Robotic hysterectomy, bilateral salpingectomy, cystoscopy    SURGEON: Dr. Garland    ASSISTANT:  None    ANESTHESIA:   General.    ANESTHESIOLOGIST:  Aakash    OPERATIVE PROCEDURE:   The patient was taken to the operating room and placed in supine position.  After adequate general anesthesia was obtained, the patient was placed in the dorsal lithotomy position and prepped and draped in the routine manner for robotic surgery.  The patient was given antibiotics  intravenously for antibiotic prophylaxis and an OG tube was placed.  The speculum was placed in the vagina and the anterior lip of the cervix was grasped with a single-tooth tenaculum.  The uterus was then sounded to 9  cm.  The cervix was then dilated to a 8. Hegar dilator.  Stay sutures of 0 Vicryl stitch were then placed at 3:00 o'clock and 9:00 o'clock.  The medium  KOH ring was then placed on the cervix, the MICHELLE catheter was inserted and bulbs were inflated.  Gloves were changed and attention was turned to the abdominal portion of the procedure.  A small infraumbilical incision was made with a scalpel and a Veress needle was inserted through this incision.  PCO2 was then insufflated into the abdominal cavity to 14 mmHg.  The trocar was then placed through the umbilical incision, and the laparoscope was placed in its sheath.  Examination of the pelvic contents revealed the uterus to be enlarged and boggy, ovaries appeared to be normal, tubes appeared to be surgically absent .  Two more trocars were then placed to the right and the left of the umbilicus under direct visualization, and a 4th trocar was then placed in the left upper quadrant.  The xi  robot was then docked and the robotic  instruments were then inserted.  The left adnexa was examined with good visualization of the left ureter.  The left fallopian tube was then grasped and the mesosalpinx was sequentially fulgurated and cut using the bipolar forceps and the monopolar scissors.  The left  utero-ovarian ligament was similarly fulgurated and cut.  This incision was then carried down through the midportion of the left round ligament.  Good hemostasis was seen.  This procedure was repeated on the right and again good hemostasis was seen.  The bladder was then sharply and bluntly dissected from the cervix.  The anterior vaginal mucosa was then entered on top of the KOH ring using the monopolar scissors and this incision was extended laterally.  The posterior vaginal mucosa was similarly entered on top of the KOH ring using the monopolar scissors and extended laterally.  The uterine vessels were then skeletonized bilaterally and fulgurated with the fenestrated forceps and cut with the monopolar scissors, and the remaining attachments of the cervix to the vagina were then cut with the monopolar scissors.  The uterus, cervix, and both tubes were then pulled through the vagina.  The vaginal cuff was then closed with 2-0 V-Loc running stitch.  The  pelvis was irrigated with saline and hemostasis was assured.  The abdomen also was desufflated and again hemostasis was assured.  The bladder flap was then reapproximated using 0 Vicryl interrupted stitch.  Excess PCO2 was then allowed to escape from the abdominal cavity and the laparoscopic instruments were then removed.  The 4 small incisions were then sutured with 4-0 Vicryl interrupted stitch for the subcutaneous tissue and 4-0 Vicryl subcuticular stitch for the skin.  Each skin incision was injected with Marcaine and bandaged.  A limited cystoscopy was performed and the bladder appeared to be normal.  The patient had been given preoperative oral peridium and intraoperative IV Lasix and good  spillage from both ureters was seen.  All vaginal appliances were then removed and the Obrien catheter was reinserted.  All sponge and needle counts were correct.  Estimated blood loss was 20  cc.  Anesthesia then did the tap block.  The patient tolerated the procedure well and was taken to the recovery room in satisfactory condition.

## 2023-08-30 ENCOUNTER — OFFICE VISIT (OUTPATIENT)
Dept: OBSTETRICS AND GYNECOLOGY | Facility: CLINIC | Age: 28
End: 2023-08-30
Payer: MEDICAID

## 2023-08-30 VITALS
WEIGHT: 120 LBS | SYSTOLIC BLOOD PRESSURE: 102 MMHG | BODY MASS INDEX: 21.26 KG/M2 | HEIGHT: 63 IN | DIASTOLIC BLOOD PRESSURE: 70 MMHG

## 2023-08-30 DIAGNOSIS — Z09 POSTOPERATIVE EXAMINATION: Primary | ICD-10-CM

## 2023-08-30 PROCEDURE — 3074F PR MOST RECENT SYSTOLIC BLOOD PRESSURE < 130 MM HG: ICD-10-PCS | Mod: ,,, | Performed by: OBSTETRICS & GYNECOLOGY

## 2023-08-30 PROCEDURE — 3074F SYST BP LT 130 MM HG: CPT | Mod: ,,, | Performed by: OBSTETRICS & GYNECOLOGY

## 2023-08-30 PROCEDURE — 99213 OFFICE O/P EST LOW 20 MIN: CPT | Mod: PBBFAC | Performed by: OBSTETRICS & GYNECOLOGY

## 2023-08-30 PROCEDURE — 3078F DIAST BP <80 MM HG: CPT | Mod: ,,, | Performed by: OBSTETRICS & GYNECOLOGY

## 2023-08-30 PROCEDURE — 3008F BODY MASS INDEX DOCD: CPT | Mod: ,,, | Performed by: OBSTETRICS & GYNECOLOGY

## 2023-08-30 PROCEDURE — 99024 POSTOP FOLLOW-UP VISIT: CPT | Mod: ,,, | Performed by: OBSTETRICS & GYNECOLOGY

## 2023-08-30 PROCEDURE — 3008F PR BODY MASS INDEX (BMI) DOCUMENTED: ICD-10-PCS | Mod: ,,, | Performed by: OBSTETRICS & GYNECOLOGY

## 2023-08-30 PROCEDURE — 3078F PR MOST RECENT DIASTOLIC BLOOD PRESSURE < 80 MM HG: ICD-10-PCS | Mod: ,,, | Performed by: OBSTETRICS & GYNECOLOGY

## 2023-08-30 PROCEDURE — 99024 PR POST-OP FOLLOW-UP VISIT: ICD-10-PCS | Mod: ,,, | Performed by: OBSTETRICS & GYNECOLOGY

## 2023-09-26 NOTE — PROGRESS NOTES
Martha Vazquez female  for   Chief Complaint   Patient presents with    Post-op Evaluation     Pt is here for 1 week post op incision check due to having a Edward. Britton on 2023.      OB History          3    Para   3    Term   3            AB        Living   3         SAB        IAB        Ectopic        Multiple   0    Live Births   3                  Past Medical History:   Diagnosis Date    39 weeks gestation of pregnancy 5/3/2021    Pregnant and not yet delivered 3/16/2021    Rh negative state in antepartum period 5/3/2021      Past Surgical History:   Procedure Laterality Date    CYSTOSCOPY N/A 2023    Procedure: CYSTOSCOPY;  Surgeon: iBn Garland MD;  Location: [facility] OR;  Service: OB/GYN;  Laterality: N/A;    ROBOT-ASSISTED LAPAROSCOPIC ABDOMINAL HYSTERECTOMY USING DA MARY XI N/A 2023    Procedure: XI ROBOTIC HYSTERECTOMY;  Surgeon: Bin Garland MD;  Location: [facility] OR;  Service: OB/GYN;  Laterality: N/A;    tongue clipped      TUBAL LIGATION        Review of patient's allergies indicates:   Allergen Reactions    Lortab [hydrocodone-acetaminophen] Nausea And Vomiting    Pcn [penicillins] Rash             Physical exam:     General Appearance: healthy      Abdomen:Normal, benign.  Incisions are healing well.    Extremity:normal    Skin: normal exam        Assessment:   Problem List Items Addressed This Visit    None  Visit Diagnoses       Postoperative examination    -  Primary             Plan:  The patient has been doing well postoperatively.  She is scheduled return in 1 month for postoperative pelvic examination.

## 2023-09-27 ENCOUNTER — OFFICE VISIT (OUTPATIENT)
Dept: OBSTETRICS AND GYNECOLOGY | Facility: CLINIC | Age: 28
End: 2023-09-27
Payer: MEDICAID

## 2023-09-27 VITALS
HEIGHT: 63 IN | DIASTOLIC BLOOD PRESSURE: 60 MMHG | BODY MASS INDEX: 21.26 KG/M2 | SYSTOLIC BLOOD PRESSURE: 90 MMHG | WEIGHT: 120 LBS

## 2023-09-27 DIAGNOSIS — Z09 POSTOPERATIVE EXAMINATION: Primary | ICD-10-CM

## 2023-09-27 PROCEDURE — 3008F PR BODY MASS INDEX (BMI) DOCUMENTED: ICD-10-PCS | Mod: ,,, | Performed by: OBSTETRICS & GYNECOLOGY

## 2023-09-27 PROCEDURE — 99024 POSTOP FOLLOW-UP VISIT: CPT | Mod: ,,, | Performed by: OBSTETRICS & GYNECOLOGY

## 2023-09-27 PROCEDURE — 99213 OFFICE O/P EST LOW 20 MIN: CPT | Mod: PBBFAC | Performed by: OBSTETRICS & GYNECOLOGY

## 2023-09-27 PROCEDURE — 3074F SYST BP LT 130 MM HG: CPT | Mod: ,,, | Performed by: OBSTETRICS & GYNECOLOGY

## 2023-09-27 PROCEDURE — 99024 PR POST-OP FOLLOW-UP VISIT: ICD-10-PCS | Mod: ,,, | Performed by: OBSTETRICS & GYNECOLOGY

## 2023-09-27 PROCEDURE — 3074F PR MOST RECENT SYSTOLIC BLOOD PRESSURE < 130 MM HG: ICD-10-PCS | Mod: ,,, | Performed by: OBSTETRICS & GYNECOLOGY

## 2023-09-27 PROCEDURE — 3008F BODY MASS INDEX DOCD: CPT | Mod: ,,, | Performed by: OBSTETRICS & GYNECOLOGY

## 2023-09-27 PROCEDURE — 3078F DIAST BP <80 MM HG: CPT | Mod: ,,, | Performed by: OBSTETRICS & GYNECOLOGY

## 2023-09-27 PROCEDURE — 3078F PR MOST RECENT DIASTOLIC BLOOD PRESSURE < 80 MM HG: ICD-10-PCS | Mod: ,,, | Performed by: OBSTETRICS & GYNECOLOGY

## 2023-09-27 NOTE — PROGRESS NOTES
Martha Vazquez female  for   Chief Complaint   Patient presents with    Post-op Evaluation     4WK FU (1 MONTH) PELVIX EXAM   23 KT BESSIEST      OB History          3    Para   3    Term   3            AB        Living   3         SAB        IAB        Ectopic        Multiple   0    Live Births   3                  Past Medical History:   Diagnosis Date    39 weeks gestation of pregnancy 5/3/2021    Pregnant and not yet delivered 3/16/2021    Rh negative state in antepartum period 5/3/2021      Past Surgical History:   Procedure Laterality Date    CYSTOSCOPY N/A 2023    Procedure: CYSTOSCOPY;  Surgeon: Bin Garland MD;  Location: [facility];  Service: OB/GYN;  Laterality: N/A;    ROBOT-ASSISTED LAPAROSCOPIC ABDOMINAL HYSTERECTOMY USING DA MARY XI N/A 2023    Procedure: XI ROBOTIC HYSTERECTOMY;  Surgeon: Bin Garland MD;  Location: [facility];  Service: OB/GYN;  Laterality: N/A;    tongue clipped      TUBAL LIGATION        Review of patient's allergies indicates:   Allergen Reactions    Lortab [hydrocodone-acetaminophen] Nausea And Vomiting    Pcn [penicillins] Rash             Physical exam:     General Appearance: healthy    Abdomen:Normal, benign.  Incisions are well healed.    Pelvic:   Vulva :  Normal vulva  Vagina: normal vagina, no discharge, exudate, lesion, or erythema.  Vaginal cuff is intact.  Uterus:  cervix is absent; adnexa not palpable    Rectal: not done  Extremity:normal    Skin: normal exam        Assessment:   Problem List Items Addressed This Visit    None       Plan:  The patient has done well postoperatively.  She is to avoid intercourse or heavy lifting for 3 more weeks.

## 2024-02-05 ENCOUNTER — OFFICE VISIT (OUTPATIENT)
Dept: NEUROLOGY | Facility: CLINIC | Age: 29
End: 2024-02-05
Payer: MEDICAID

## 2024-02-05 VITALS
WEIGHT: 120 LBS | RESPIRATION RATE: 18 BRPM | HEIGHT: 63 IN | SYSTOLIC BLOOD PRESSURE: 112 MMHG | BODY MASS INDEX: 21.26 KG/M2 | DIASTOLIC BLOOD PRESSURE: 70 MMHG | HEART RATE: 62 BPM | OXYGEN SATURATION: 100 %

## 2024-02-05 DIAGNOSIS — G43.909 MIGRAINE WITHOUT STATUS MIGRAINOSUS, NOT INTRACTABLE, UNSPECIFIED MIGRAINE TYPE: Primary | ICD-10-CM

## 2024-02-05 PROCEDURE — 99214 OFFICE O/P EST MOD 30 MIN: CPT | Mod: S$PBB,,, | Performed by: PSYCHIATRY & NEUROLOGY

## 2024-02-05 PROCEDURE — 99214 OFFICE O/P EST MOD 30 MIN: CPT | Mod: PBBFAC | Performed by: PSYCHIATRY & NEUROLOGY

## 2024-02-05 PROCEDURE — 3078F DIAST BP <80 MM HG: CPT | Mod: ,,, | Performed by: PSYCHIATRY & NEUROLOGY

## 2024-02-05 PROCEDURE — 3074F SYST BP LT 130 MM HG: CPT | Mod: ,,, | Performed by: PSYCHIATRY & NEUROLOGY

## 2024-02-05 PROCEDURE — 3008F BODY MASS INDEX DOCD: CPT | Mod: ,,, | Performed by: PSYCHIATRY & NEUROLOGY

## 2024-02-05 RX ORDER — RIZATRIPTAN BENZOATE 10 MG/1
10 TABLET ORAL DAILY PRN
Qty: 27 TABLET | Refills: 3 | Status: SHIPPED | OUTPATIENT
Start: 2024-02-05

## 2024-02-05 RX ORDER — DOXYCYCLINE 100 MG/1
CAPSULE ORAL
COMMUNITY
Start: 2023-10-10

## 2024-02-05 RX ORDER — CEFUROXIME AXETIL 250 MG/1
TABLET ORAL
COMMUNITY
Start: 2023-10-30

## 2024-02-05 RX ORDER — TOPIRAMATE 50 MG/1
100 TABLET, FILM COATED ORAL 2 TIMES DAILY
Qty: 360 TABLET | Refills: 3 | Status: SHIPPED | OUTPATIENT
Start: 2024-02-05

## 2024-02-05 NOTE — PATIENT INSTRUCTIONS
Cont TOPAMAX but may taper back to 50 mg twice daily  Cont Maxalt as needed onset of migraine  F/u 6 months

## 2024-06-28 ENCOUNTER — HOSPITAL ENCOUNTER (EMERGENCY)
Facility: HOSPITAL | Age: 29
Discharge: HOME | End: 2024-06-28
Attending: SPECIALIST
Payer: MEDICAID

## 2024-06-28 VITALS
OXYGEN SATURATION: 100 % | SYSTOLIC BLOOD PRESSURE: 98 MMHG | HEART RATE: 58 BPM | BODY MASS INDEX: 19.96 KG/M2 | RESPIRATION RATE: 18 BRPM | DIASTOLIC BLOOD PRESSURE: 61 MMHG | WEIGHT: 112.63 LBS | TEMPERATURE: 98 F | HEIGHT: 63 IN

## 2024-06-28 DIAGNOSIS — M25.511 ACUTE PAIN OF RIGHT SHOULDER: Primary | ICD-10-CM

## 2024-06-28 DIAGNOSIS — R52 PAIN: ICD-10-CM

## 2024-06-28 PROCEDURE — 99284 EMERGENCY DEPT VISIT MOD MDM: CPT | Mod: ,,, | Performed by: SPECIALIST

## 2024-06-28 PROCEDURE — 63600175 PHARM REV CODE 636 W HCPCS: Performed by: SPECIALIST

## 2024-06-28 PROCEDURE — 99284 EMERGENCY DEPT VISIT MOD MDM: CPT | Mod: 25

## 2024-06-28 PROCEDURE — 96372 THER/PROPH/DIAG INJ SC/IM: CPT | Performed by: SPECIALIST

## 2024-06-28 RX ORDER — DEXAMETHASONE SODIUM PHOSPHATE 4 MG/ML
4 INJECTION, SOLUTION INTRA-ARTICULAR; INTRALESIONAL; INTRAMUSCULAR; INTRAVENOUS; SOFT TISSUE
Status: COMPLETED | OUTPATIENT
Start: 2024-06-28 | End: 2024-06-28

## 2024-06-28 RX ORDER — KETOROLAC TROMETHAMINE 30 MG/ML
60 INJECTION, SOLUTION INTRAMUSCULAR; INTRAVENOUS
Status: COMPLETED | OUTPATIENT
Start: 2024-06-28 | End: 2024-06-28

## 2024-06-28 RX ORDER — DICLOFENAC EPOLAMINE 0.01 G/1
SYSTEM TOPICAL
COMMUNITY

## 2024-06-28 RX ORDER — METHOCARBAMOL 750 MG/1
750 TABLET, FILM COATED ORAL 4 TIMES DAILY
Qty: 160 TABLET | Refills: 0 | Status: SHIPPED | OUTPATIENT
Start: 2024-06-28 | End: 2024-08-07

## 2024-06-28 RX ADMIN — DEXAMETHASONE SODIUM PHOSPHATE 4 MG: 4 INJECTION, SOLUTION INTRA-ARTICULAR; INTRALESIONAL; INTRAMUSCULAR; INTRAVENOUS; SOFT TISSUE at 02:06

## 2024-06-28 RX ADMIN — KETOROLAC TROMETHAMINE 60 MG: 60 INJECTION, SOLUTION INTRAMUSCULAR at 02:06

## 2024-06-28 NOTE — ED PROVIDER NOTES
Encounter Date: 6/28/2024       History     Chief Complaint   Patient presents with    Shoulder Pain     Right shoulder     Patient is 30 yo wf who gets trigger injections for her trapezius pain but she found out Sujata is not in office.  I suggested that she call the office to see what other urgent care may do those.  However, she has a mild to moderate pain in Trapezium on the right in one spot.       Review of patient's allergies indicates:   Allergen Reactions    Lortab [hydrocodone-acetaminophen] Nausea And Vomiting    Pcn [penicillins] Rash     Past Medical History:   Diagnosis Date    39 weeks gestation of pregnancy 05/03/2021    Migraine headache     Pregnant and not yet delivered 03/16/2021    Rh negative state in antepartum period 05/03/2021     Past Surgical History:   Procedure Laterality Date    CYSTOSCOPY N/A 08/23/2023    Procedure: CYSTOSCOPY;  Surgeon: Bin Garland MD;  Location: [facility];  Service: OB/GYN;  Laterality: N/A;    ROBOT-ASSISTED LAPAROSCOPIC ABDOMINAL HYSTERECTOMY USING DA MARY XI N/A 08/23/2023    Procedure: XI ROBOTIC HYSTERECTOMY;  Surgeon: Bin Garland MD;  Location: [facility];  Service: OB/GYN;  Laterality: N/A;    tongue clipped      TUBAL LIGATION       Family History   Problem Relation Name Age of Onset    Hypertension Paternal Grandfather Ministerio Vazquez     Stroke Paternal Grandfather Ministerio Vazquez     Hypertension Father Regan Vazquez     Stroke Father Regan Vazquez     Breast cancer Neg Hx      Colon cancer Neg Hx      Miscarriages / Stillbirths Neg Hx       Social History     Tobacco Use    Smoking status: Every Day     Types: Vaping with nicotine    Smokeless tobacco: Current   Substance Use Topics    Alcohol use: Never    Drug use: Never     Review of Systems   Musculoskeletal:  Positive for back pain.       Physical Exam     Initial Vitals [06/28/24 1341]   BP Pulse Resp Temp SpO2   98/61 (!) 58 18 98 °F (36.7 °C) 100 %      MAP       --         Physical  Exam    Nursing note and vitals reviewed.  Constitutional: She appears well-developed and well-nourished. No distress.   HENT:   Head: Normocephalic and atraumatic.   Eyes: Pupils are equal, round, and reactive to light.   Neck: Neck supple.   Normal range of motion.  Cardiovascular:  Normal rate.           Pulmonary/Chest: Breath sounds normal.   Abdominal: Abdomen is soft.   Musculoskeletal:         General: Tenderness present. Normal range of motion.      Cervical back: Normal range of motion and neck supple.      Comments: Scapula pain medially on right.  Negative Spurlings test Neck with FROM and pain is elicited with arm extended forward and raising to greater than 45 degrees     Neurological: She is alert and oriented to person, place, and time. She has normal strength.   Skin: Skin is warm.   Psychiatric: She has a normal mood and affect. Her behavior is normal. Thought content normal.         Medical Screening Exam   See Full Note    ED Course   Procedures  Labs Reviewed - No data to display       Imaging Results    None          Medications   ketorolac injection 60 mg (has no administration in time range)   dexAMETHasone injection 4 mg (has no administration in time range)     Medical Decision Making                                    Clinical Impression:   Final diagnoses:  [M25.511] Acute pain of right shoulder (Primary)  [R52] Pain        ED Disposition Condition    Discharge Stable          ED Prescriptions       Medication Sig Dispense Start Date End Date Auth. Provider    methocarbamoL (ROBAXIN) 750 MG Tab Take 1 tablet (750 mg total) by mouth 4 (four) times daily. 160 tablet 6/28/2024 8/7/2024 Latia Sanders MD          Follow-up Information    None          Latia Sanders MD  06/28/24 3443

## 2024-06-28 NOTE — ED TRIAGE NOTES
Presented to [facility] with complaint of right should pain that becomes sharp/stabbing when trying to move right arm. Reports having issue with should for several weeks but past 2 days has not been able to utilize arm. Attempted to see AQUILES Brownlee NP today but office closed.

## 2024-08-05 ENCOUNTER — OFFICE VISIT (OUTPATIENT)
Dept: NEUROLOGY | Facility: CLINIC | Age: 29
End: 2024-08-05
Payer: MEDICAID

## 2024-08-05 VITALS
RESPIRATION RATE: 20 BRPM | BODY MASS INDEX: 19.81 KG/M2 | HEIGHT: 63 IN | HEART RATE: 48 BPM | WEIGHT: 111.81 LBS | DIASTOLIC BLOOD PRESSURE: 64 MMHG | SYSTOLIC BLOOD PRESSURE: 98 MMHG | OXYGEN SATURATION: 100 %

## 2024-08-05 DIAGNOSIS — G43.009 MIGRAINE WITHOUT AURA AND WITHOUT STATUS MIGRAINOSUS, NOT INTRACTABLE: Primary | ICD-10-CM

## 2024-08-05 PROCEDURE — 99214 OFFICE O/P EST MOD 30 MIN: CPT | Mod: PBBFAC | Performed by: PSYCHIATRY & NEUROLOGY

## 2024-08-05 PROCEDURE — 3078F DIAST BP <80 MM HG: CPT | Mod: ,,, | Performed by: PSYCHIATRY & NEUROLOGY

## 2024-08-05 PROCEDURE — 3074F SYST BP LT 130 MM HG: CPT | Mod: ,,, | Performed by: PSYCHIATRY & NEUROLOGY

## 2024-08-05 PROCEDURE — 99999 PR PBB SHADOW E&M-EST. PATIENT-LVL IV: CPT | Mod: PBBFAC,,, | Performed by: PSYCHIATRY & NEUROLOGY

## 2024-08-05 PROCEDURE — 3008F BODY MASS INDEX DOCD: CPT | Mod: ,,, | Performed by: PSYCHIATRY & NEUROLOGY

## 2024-08-05 PROCEDURE — 99214 OFFICE O/P EST MOD 30 MIN: CPT | Mod: S$PBB,,, | Performed by: PSYCHIATRY & NEUROLOGY

## 2024-08-05 RX ORDER — MEDROXYPROGESTERONE ACETATE 2.5 MG/1
TABLET ORAL
COMMUNITY

## 2024-08-05 RX ORDER — RIZATRIPTAN BENZOATE 10 MG/1
TABLET ORAL
COMMUNITY
Start: 2024-07-10

## 2024-08-05 RX ORDER — TOPIRAMATE 100 MG/1
100 TABLET, FILM COATED ORAL 2 TIMES DAILY
Qty: 180 TABLET | Refills: 3 | Status: SHIPPED | OUTPATIENT
Start: 2024-08-05

## 2024-10-29 ENCOUNTER — HOSPITAL ENCOUNTER (EMERGENCY)
Facility: HOSPITAL | Age: 29
Discharge: HOME | End: 2024-10-29
Attending: EMERGENCY MEDICINE
Payer: MEDICAID

## 2024-10-29 VITALS
OXYGEN SATURATION: 100 % | DIASTOLIC BLOOD PRESSURE: 70 MMHG | HEIGHT: 63 IN | SYSTOLIC BLOOD PRESSURE: 105 MMHG | RESPIRATION RATE: 17 BRPM | WEIGHT: 115 LBS | HEART RATE: 87 BPM | BODY MASS INDEX: 20.38 KG/M2 | TEMPERATURE: 98 F

## 2024-10-29 DIAGNOSIS — R10.13 EPIGASTRIC PAIN: Primary | ICD-10-CM

## 2024-10-29 DIAGNOSIS — K58.1 IRRITABLE BOWEL SYNDROME WITH CONSTIPATION: ICD-10-CM

## 2024-10-29 LAB
ALBUMIN SERPL BCP-MCNC: 3.8 G/DL (ref 3.5–5)
ALBUMIN/GLOB SERPL: 1.2 {RATIO}
ALP SERPL-CCNC: 55 U/L (ref 37–98)
ALT SERPL W P-5'-P-CCNC: 42 U/L (ref 13–56)
AMYLASE SERPL-CCNC: 37 U/L (ref 25–115)
ANION GAP SERPL CALCULATED.3IONS-SCNC: 14 MMOL/L (ref 7–16)
AST SERPL W P-5'-P-CCNC: 25 U/L (ref 15–37)
BASOPHILS # BLD AUTO: 0.02 K/UL (ref 0–0.2)
BASOPHILS NFR BLD AUTO: 0.4 % (ref 0–1)
BILIRUB SERPL-MCNC: 0.3 MG/DL (ref ?–1.2)
BILIRUB UR QL STRIP: NEGATIVE
BUN SERPL-MCNC: 11 MG/DL (ref 7–18)
BUN/CREAT SERPL: 18 (ref 6–20)
CALCIUM SERPL-MCNC: 8.6 MG/DL (ref 8.5–10.1)
CHLORIDE SERPL-SCNC: 106 MMOL/L (ref 98–107)
CLARITY UR: CLEAR
CO2 SERPL-SCNC: 22 MMOL/L (ref 21–32)
COLOR UR: YELLOW
CREAT SERPL-MCNC: 0.61 MG/DL (ref 0.55–1.02)
DIFFERENTIAL METHOD BLD: ABNORMAL
EGFR (NO RACE VARIABLE) (RUSH/TITUS): 123 ML/MIN/1.73M2
EOSINOPHIL # BLD AUTO: 0.03 K/UL (ref 0–0.5)
EOSINOPHIL NFR BLD AUTO: 0.6 % (ref 1–4)
ERYTHROCYTE [DISTWIDTH] IN BLOOD BY AUTOMATED COUNT: 13.1 % (ref 11.5–14.5)
GLOBULIN SER-MCNC: 3.2 G/DL (ref 2–4)
GLUCOSE SERPL-MCNC: 97 MG/DL (ref 74–106)
GLUCOSE UR STRIP-MCNC: NEGATIVE MG/DL
HCT VFR BLD AUTO: 35.6 % (ref 38–47)
HGB BLD-MCNC: 12 G/DL (ref 12–16)
IMM GRANULOCYTES # BLD AUTO: 0.02 K/UL (ref 0–0.04)
IMM GRANULOCYTES NFR BLD: 0.4 % (ref 0–0.4)
KETONES UR STRIP-SCNC: NEGATIVE MG/DL
LEUKOCYTE ESTERASE UR QL STRIP: NEGATIVE
LIPASE SERPL-CCNC: 26 U/L (ref 16–77)
LYMPHOCYTES # BLD AUTO: 1.4 K/UL (ref 1–4.8)
LYMPHOCYTES NFR BLD AUTO: 30 % (ref 27–41)
MAGNESIUM SERPL-MCNC: 1.9 MG/DL (ref 1.7–2.3)
MCH RBC QN AUTO: 29.2 PG (ref 27–31)
MCHC RBC AUTO-ENTMCNC: 33.7 G/DL (ref 32–36)
MCV RBC AUTO: 86.6 FL (ref 80–96)
MONOCYTES # BLD AUTO: 0.35 K/UL (ref 0–0.8)
MONOCYTES NFR BLD AUTO: 7.5 % (ref 2–6)
MPC BLD CALC-MCNC: 13.2 FL (ref 9.4–12.4)
NEUTROPHILS # BLD AUTO: 2.85 K/UL (ref 1.8–7.7)
NEUTROPHILS NFR BLD AUTO: 61.1 % (ref 53–65)
NITRITE UR QL STRIP: NEGATIVE
NRBC # BLD AUTO: 0 X10E3/UL
NRBC, AUTO (.00): 0 %
PH UR STRIP: 7 PH UNITS
PLATELET # BLD AUTO: 166 K/UL (ref 150–400)
PLATELET MORPHOLOGY: NORMAL
POTASSIUM SERPL-SCNC: 3.5 MMOL/L (ref 3.5–5.1)
PROT SERPL-MCNC: 7 G/DL (ref 6.4–8.2)
PROT UR QL STRIP: NEGATIVE
RBC # BLD AUTO: 4.11 M/UL (ref 4.2–5.4)
RBC # UR STRIP: NEGATIVE /UL
SODIUM SERPL-SCNC: 138 MMOL/L (ref 136–145)
SP GR UR STRIP: 1.02
TROPONIN I SERPL DL<=0.01 NG/ML-MCNC: <4 PG/ML
UROBILINOGEN UR STRIP-ACNC: 0.2 MG/DL
WBC # BLD AUTO: 4.67 K/UL (ref 4.5–11)

## 2024-10-29 PROCEDURE — 80053 COMPREHEN METABOLIC PANEL: CPT | Performed by: EMERGENCY MEDICINE

## 2024-10-29 PROCEDURE — 99285 EMERGENCY DEPT VISIT HI MDM: CPT | Mod: 25

## 2024-10-29 PROCEDURE — 82150 ASSAY OF AMYLASE: CPT | Performed by: EMERGENCY MEDICINE

## 2024-10-29 PROCEDURE — 84484 ASSAY OF TROPONIN QUANT: CPT | Performed by: EMERGENCY MEDICINE

## 2024-10-29 PROCEDURE — 85025 COMPLETE CBC W/AUTO DIFF WBC: CPT | Performed by: EMERGENCY MEDICINE

## 2024-10-29 PROCEDURE — 93005 ELECTROCARDIOGRAM TRACING: CPT

## 2024-10-29 PROCEDURE — 81003 URINALYSIS AUTO W/O SCOPE: CPT | Performed by: EMERGENCY MEDICINE

## 2024-10-29 PROCEDURE — 25000003 PHARM REV CODE 250: Performed by: EMERGENCY MEDICINE

## 2024-10-29 PROCEDURE — 96374 THER/PROPH/DIAG INJ IV PUSH: CPT

## 2024-10-29 PROCEDURE — 63600175 PHARM REV CODE 636 W HCPCS: Performed by: EMERGENCY MEDICINE

## 2024-10-29 PROCEDURE — 83735 ASSAY OF MAGNESIUM: CPT | Performed by: EMERGENCY MEDICINE

## 2024-10-29 PROCEDURE — 83690 ASSAY OF LIPASE: CPT | Performed by: EMERGENCY MEDICINE

## 2024-10-29 PROCEDURE — 96375 TX/PRO/DX INJ NEW DRUG ADDON: CPT

## 2024-10-29 RX ORDER — LINACLOTIDE 72 UG/1
72 CAPSULE, GELATIN COATED ORAL EVERY MORNING
COMMUNITY
Start: 2024-10-28

## 2024-10-29 RX ORDER — PANTOPRAZOLE SODIUM 40 MG/1
40 TABLET, DELAYED RELEASE ORAL DAILY
Qty: 90 TABLET | Refills: 3 | Status: SHIPPED | OUTPATIENT
Start: 2024-10-29 | End: 2024-10-29

## 2024-10-29 RX ORDER — ALUMINUM HYDROXIDE, MAGNESIUM HYDROXIDE, AND SIMETHICONE 1200; 120; 1200 MG/30ML; MG/30ML; MG/30ML
30 SUSPENSION ORAL ONCE
Status: COMPLETED | OUTPATIENT
Start: 2024-10-29 | End: 2024-10-29

## 2024-10-29 RX ORDER — PANTOPRAZOLE SODIUM 40 MG/10ML
40 INJECTION, POWDER, LYOPHILIZED, FOR SOLUTION INTRAVENOUS
Status: COMPLETED | OUTPATIENT
Start: 2024-10-29 | End: 2024-10-29

## 2024-10-29 RX ORDER — FAMOTIDINE 10 MG/ML
20 INJECTION INTRAVENOUS
Status: COMPLETED | OUTPATIENT
Start: 2024-10-29 | End: 2024-10-29

## 2024-10-29 RX ORDER — LIDOCAINE HYDROCHLORIDE 20 MG/ML
15 SOLUTION OROPHARYNGEAL ONCE
Status: COMPLETED | OUTPATIENT
Start: 2024-10-29 | End: 2024-10-29

## 2024-10-29 RX ORDER — HYOSCYAMINE SULFATE 0.125 MG
125 TABLET ORAL EVERY 6 HOURS PRN
COMMUNITY

## 2024-10-29 RX ORDER — PANTOPRAZOLE SODIUM 40 MG/1
40 TABLET, DELAYED RELEASE ORAL DAILY
Qty: 30 TABLET | Refills: 0 | Status: SHIPPED | OUTPATIENT
Start: 2024-10-29 | End: 2025-10-29

## 2024-10-29 RX ORDER — TOPIRAMATE 50 MG/1
TABLET, FILM COATED ORAL
COMMUNITY

## 2024-10-29 RX ADMIN — PANTOPRAZOLE SODIUM 40 MG: 40 INJECTION, POWDER, LYOPHILIZED, FOR SOLUTION INTRAVENOUS at 11:10

## 2024-10-29 RX ADMIN — FAMOTIDINE 20 MG: 10 INJECTION, SOLUTION INTRAVENOUS at 11:10

## 2024-10-29 RX ADMIN — ALUMINUM HYDROXIDE, MAGNESIUM HYDROXIDE, AND SIMETHICONE 30 ML: 1200; 120; 1200 SUSPENSION ORAL at 01:10

## 2024-10-29 RX ADMIN — SODIUM CHLORIDE 1000 ML: 9 INJECTION, SOLUTION INTRAVENOUS at 11:10

## 2024-10-29 RX ADMIN — LIDOCAINE HYDROCHLORIDE 15 ML: 20 SOLUTION ORAL at 01:10

## 2024-10-29 NOTE — ED NOTES
Pt states she has in the past tried multiple stool medications off and on since middle school being a child - pt states she had a xray office yesterday - told her she was constipated - pt states she has had no nausea and vomiting - states miralax and lactulose have not worked for her in the past    ambulatory

## 2024-10-29 NOTE — ED PROVIDER NOTES
Encounter Date: 10/29/2024       History     Chief Complaint   Patient presents with    Chest Pain    Abdominal Pain     Patient presents with epigastric abdominal pain that started 4 days ago.  Has a history of irritable bowel syndrome.  States she saw her primary care provider yesterday in the told her that she was probably constipated.  She states she feels like something is wrong.  States she had this pain once before after eating filet a fish sandwich from [employer] but it went away afterwards.  This pain has persisted for the past 4 days.  No nausea or vomiting, no diarrhea.  Has a chronic problem with constipation but had a normal bowel movement this morning.  Reports pain was sharp initially now is more dull.      Review of patient's allergies indicates:   Allergen Reactions    Morphine Other (See Comments)    Lortab [hydrocodone-acetaminophen] Nausea And Vomiting    Penicillins Rash and Other (See Comments)     Past Medical History:   Diagnosis Date    39 weeks gestation of pregnancy 05/03/2021    Irritable bowel syndrome     Migraine headache     Pregnant and not yet delivered 03/16/2021    Rh negative state in antepartum period 05/03/2021     Past Surgical History:   Procedure Laterality Date    CYSTOSCOPY N/A 08/23/2023    Procedure: CYSTOSCOPY;  Surgeon: Bin Garland MD;  Location: [facility] OR;  Service: OB/GYN;  Laterality: N/A;    ROBOT-ASSISTED LAPAROSCOPIC ABDOMINAL HYSTERECTOMY USING DA MARY XI N/A 08/23/2023    Procedure: XI ROBOTIC HYSTERECTOMY;  Surgeon: Bin Garland MD;  Location: [facility], [place];  Service: OB/GYN;  Laterality: N/A;    tongue clipped      TUBAL LIGATION       Family History   Problem Relation Name Age of Onset    Hypertension Paternal Grandfather Ministerio Vazquez     Stroke Paternal Grandfather Ministerio Vazquez     Hypertension Father Regan George     Stroke Father Regan Vazquez     Breast cancer Neg Hx      Colon cancer Neg Hx      Miscarriages / Stillbirths Neg Hx       Social  History     Tobacco Use    Smoking status: Every Day     Types: Vaping with nicotine    Smokeless tobacco: Current   Substance Use Topics    Alcohol use: Never    Drug use: Never     Review of Systems   Constitutional:  Negative for activity change, appetite change, diaphoresis, fatigue and fever.   HENT: Negative.     Eyes: Negative.    Respiratory: Negative.  Negative for shortness of breath.    Cardiovascular: Negative.  Negative for chest pain.   Gastrointestinal:  Positive for abdominal pain. Negative for abdominal distention, blood in stool, constipation, diarrhea, nausea and vomiting.   Genitourinary: Negative.    Musculoskeletal: Negative.    Skin: Negative.    Neurological: Negative.    Psychiatric/Behavioral: Negative.     All other systems reviewed and are negative.      Physical Exam     Initial Vitals [10/29/24 1029]   BP Pulse Resp Temp SpO2   104/67 67 18 98.2 °F (36.8 °C) 99 %      MAP       --         Physical Exam    Nursing note and vitals reviewed.  Constitutional: She appears well-developed and well-nourished. No distress.   HENT:   Right Ear: External ear normal.   Left Ear: External ear normal.   Nose: Nose normal. Mouth/Throat: Oropharynx is clear and moist.   Eyes: Conjunctivae and EOM are normal. Pupils are equal, round, and reactive to light. No scleral icterus.   Neck: Neck supple. No JVD present.   Normal range of motion.  Cardiovascular:  Normal rate, regular rhythm, normal heart sounds and intact distal pulses.           No murmur heard.  Pulmonary/Chest: Breath sounds normal. No stridor. No respiratory distress. She has no wheezes. She has no rhonchi. She has no rales.   Abdominal: Abdomen is soft. Bowel sounds are normal. She exhibits no distension. There is no abdominal tenderness.   Musculoskeletal:         General: No tenderness or edema. Normal range of motion.      Cervical back: Normal range of motion and neck supple.     Lymphadenopathy:     She has no cervical adenopathy.    Neurological: She is alert and oriented to person, place, and time. She has normal strength. No cranial nerve deficit. GCS score is 15. GCS eye subscore is 4. GCS verbal subscore is 5. GCS motor subscore is 6.   Skin: Skin is warm and dry. Capillary refill takes less than 2 seconds. No rash noted. No erythema. No pallor.   Psychiatric: She has a normal mood and affect. Her behavior is normal.         Medical Screening Exam   See Full Note    ED Course   Procedures  Labs Reviewed   CBC WITH DIFFERENTIAL - Abnormal       Result Value    WBC 4.67      RBC 4.11 (*)     Hemoglobin 12.0      Hematocrit 35.6 (*)     MCV 86.6      MCH 29.2      MCHC 33.7      RDW 13.1      Platelet Count 166      MPV 13.2 (*)     Neutrophils % 61.1      Lymphocytes % 30.0      Monocytes % 7.5 (*)     Eosinophils % 0.6 (*)     Basophils % 0.4      Immature Granulocytes % 0.4      nRBC, Auto 0.0      Neutrophils, Abs 2.85      Lymphocytes, Absolute 1.40      Monocytes, Absolute 0.35      Eosinophils, Absolute 0.03      Basophils, Absolute 0.02      Immature Granulocytes, Absolute 0.02      nRBC, Absolute 0.00      Diff Type Scan Smear     MAGNESIUM - Normal    Magnesium 1.9     LIPASE - Normal    Lipase 26     AMYLASE - Normal    Amylase 37     TROPONIN I - Normal    Troponin I High Sensitivity <4.0     CBC MORPHOLOGY - Normal    Platelet Morphology Normal     CBC W/ AUTO DIFFERENTIAL    Narrative:     The following orders were created for panel order CBC auto differential.  Procedure                               Abnormality         Status                     ---------                               -----------         ------                     CBC with Differential[[id number]]       Abnormal            Final result                 Please view results for these tests on the individual orders.   COMPREHENSIVE METABOLIC PANEL    Sodium 138      Potassium 3.5      Chloride 106      CO2 22      Anion Gap 14      Glucose 97      BUN 11       Creatinine 0.61      BUN/Creatinine Ratio 18      Calcium 8.6      Total Protein 7.0      Albumin 3.8      Globulin 3.2      A/G Ratio 1.2      Bilirubin, Total 0.3      Alk Phos 55      ALT 42      AST 25      eGFR 123     URINALYSIS, REFLEX TO URINE CULTURE    Color, UA Yellow      Clarity, UA Clear      pH, UA 7.0      Leukocytes, UA Negative      Nitrites, UA Negative      Protein, UA Negative      Glucose, UA Negative      Ketones, UA Negative      Urobilinogen, UA 0.2      Bilirubin, UA Negative      Blood, UA Negative      Specific Gravity, UA 1.025            Imaging Results              X-Ray Abdomen Flat And Erect (Final result)  Result time 10/29/24 13:43:47      Final result by Jonathan Hernandez MD (10/29/24 13:43:47)                   Impression:      No acute radiographic findings of the abdomen.      Electronically signed by: Jonathan Hernandez  Date:    10/29/2024  Time:    13:43               Narrative:    EXAMINATION:  XR ABDOMEN FLAT AND ERECT    CLINICAL HISTORY:  Epigastric pain    TECHNIQUE:  Flat and erect AP views of the abdomen were performed.    COMPARISON:  None    FINDINGS:  Normal bowel-gas pattern.  No plain film evidence for bowel obstruction.  No dilated loops of small bowel.    No significant abdominal calcifications.    Bony pelvis is intact.                                       US Abdomen Complete (Final result)  Result time 10/29/24 12:09:41      Final result by Jonathan Hernandez MD (10/29/24 12:09:41)                   Impression:      No significant ultrasound abnormality.      Electronically signed by: Jonathan Hernandez  Date:    10/29/2024  Time:    12:09               Narrative:    EXAMINATION:  US ABDOMEN COMPLETE    CLINICAL HISTORY:  Epigastric pain    TECHNIQUE:  Complete abdominal ultrasound (including pancreas, aorta, liver, gallbladder, common bile duct, IVC, kidneys, and spleen) was performed.    COMPARISON:  None    FINDINGS:  The liver is normal in size and  echogenicity measuring 15.1 cm.  The portal vein is identified and patent demonstrating normal flow by color and power Doppler.  No perihepatic fluid.    The gallbladder is normally distended without cholelithiasis.  No gallbladder wall thickening or pericholecystic fluid.  Negative sonographic Pagan's sign.  Common bile duct is normal measuring 0.34 cm.    The visualized pancreas is normal in size and echogenicity.    Kidneys are normal in size and echogenicity measuring 10.5 cm on the right and 10.2 cm on left.  Both kidneys demonstrate normal flow by color Doppler.  No changes of hydronephrosis.    The spleen is normal in size and echogenicity measuring 10.0 cm.    Abdominal aorta and IVC identified.    No ascites identified.                                       Medications   sodium chloride 0.9% bolus 1,000 mL 1,000 mL ( Intravenous Stopped 10/29/24 1246)   pantoprazole injection 40 mg (40 mg Intravenous Given 10/29/24 1152)   famotidine (PF) injection 20 mg (20 mg Intravenous Given 10/29/24 1142)   aluminum-magnesium hydroxide-simethicone 200-200-20 mg/5 mL suspension 30 mL (30 mLs Oral Given 10/29/24 1302)     And   LIDOcaine viscous HCl 2% oral solution 15 mL (15 mLs Oral Given 10/29/24 1302)     Medical Decision Making  Differential diagnosis includes constipation, peptic ulcer disease, acid reflux, esophageal spasm, gastritis, colitis, pancreatitis, gallbladder disease.    Patient was treated with IV fluid, IV Pepcid and IV Protonix.  She was given a GI cocktail.  No significant abnormality found on workup here in the emergency department.  I suspect the patient has gastroesophageal reflux and could have gastritis, esophagitis and or peptic ulcer disease.  Recommend follow up with Gastroenterology outpatient for upper endoscopy and ongoing management of chronic IBS.  Request for GI follow up requested via electronic medical record.  Discharge and follow up instructions given.    Amount and/or Complexity  of Data Reviewed  Labs: ordered. Decision-making details documented in ED Course.  Radiology: ordered. Decision-making details documented in ED Course.  ECG/medicine tests: ordered and independent interpretation performed.     Details: EKG shows sinus bradycardia with a rate of 57, no ST elevation or depression.    Risk  OTC drugs.  Prescription drug management.               ED Course as of 10/29/24 1433   Tue Oct 29, 2024   1250 Urinalysis, Reflex to Urine Culture  Urinalysis is normal [LM]   1250 Amylase  Amylase is normal [LM]   1250 Lipase  Lipase is nor [LM]   1250 Magnesium  Magnesium is normal [LM]   1250 Comprehensive metabolic panel  CMP is normal [LM]   1250 Troponin I  Troponin is normal at less than 4.0 [LM]   1250 CBC auto differential(!)  CBC shows normal white blood cell count, essentially normal hemoglobin and hematocrit, normal platelet count. [LM]   1250 US Abdomen Complete  Abdominal ultrasound shows no abnormality. [LM]   1428 X-Ray Abdomen Flat And Erect  Review of radiologist's report for x-ray of the abdomen flat and erect shows no evidence of bowel obstruction, no acute process seen. [LM]      ED Course User Index  [LM] Deny Pollack DO                             Clinical Impression:   Final diagnoses:  [R10.13] Epigastric pain (Primary)  [K58.1] Irritable bowel syndrome with constipation        ED Disposition Condition    Discharge Stable          ED Prescriptions       Medication Sig Dispense Start Date End Date Auth. Provider    pantoprazole (PROTONIX) 40 MG tablet Take 1 tablet (40 mg total) by mouth once daily. 90 tablet 10/29/2024 10/29/2025 Deny Pollack DO          Follow-up Information       Follow up With Specialties Details Why Contact Info    Gastroenterology  Schedule an appointment as soon as possible for a visit  See your primary physician or go to the nearest emergency department sooner if any new concerns. We have requested an appointment for you, if you do not  hear from somebody in the next 3 days, contact your primary physician for a referral.             Deny Pollack,   10/29/24 9540

## 2024-10-29 NOTE — ED TRIAGE NOTES
Pt ambulatory to er with c/o pain to epigastric area radiating up in to chest- pt has hx of irritable bowel- pt says something is not right- pt saw forrest at dr meza's office on yesterday - states they said she was constipated - pt c./o pain since last Friday- pt states she has take otc meds with no relief

## 2024-10-30 LAB
OHS QRS DURATION: 76 MS
OHS QTC CALCULATION: 391 MS

## 2024-12-02 ENCOUNTER — OFFICE VISIT (OUTPATIENT)
Dept: GASTROENTEROLOGY | Facility: CLINIC | Age: 29
End: 2024-12-02
Payer: MEDICAID

## 2024-12-02 VITALS
DIASTOLIC BLOOD PRESSURE: 36 MMHG | HEIGHT: 63 IN | WEIGHT: 117.81 LBS | HEART RATE: 70 BPM | SYSTOLIC BLOOD PRESSURE: 99 MMHG | OXYGEN SATURATION: 98 % | BODY MASS INDEX: 20.88 KG/M2

## 2024-12-02 DIAGNOSIS — R10.13 EPIGASTRIC PAIN: ICD-10-CM

## 2024-12-02 DIAGNOSIS — K58.1 IRRITABLE BOWEL SYNDROME WITH CONSTIPATION: ICD-10-CM

## 2024-12-02 PROCEDURE — 3008F BODY MASS INDEX DOCD: CPT | Mod: S$PBB,,,

## 2024-12-02 PROCEDURE — 99204 OFFICE O/P NEW MOD 45 MIN: CPT | Mod: S$PBB,,,

## 2024-12-02 PROCEDURE — 3074F SYST BP LT 130 MM HG: CPT | Mod: S$PBB,,,

## 2024-12-02 PROCEDURE — 99999 PR PBB SHADOW E&M-EST. PATIENT-LVL IV: CPT | Mod: PBBFAC,,,

## 2024-12-02 PROCEDURE — 99214 OFFICE O/P EST MOD 30 MIN: CPT | Mod: PBBFAC

## 2024-12-02 PROCEDURE — 3078F DIAST BP <80 MM HG: CPT | Mod: S$PBB,,,

## 2024-12-02 NOTE — PATIENT INSTRUCTIONS
Avoid spicy, greasy foods  Avoid caffeine, citric acid, chocolate, peppermint, and carbonated drinks  Do not lay down within 3 hours of eating  Exercise 150 minutes per week  Increase fluid to 64 ounces daily  Avoid antiinflammatory medications such as motrin, advil, aleve, ibuprofen, and BC powder     Take Prilosec 20 mg 30 mins before eating in am and then take 30 mins before your last meal if heartburn, epigastric pain starts back and make another appt with me.

## 2024-12-02 NOTE — PROGRESS NOTES
CC:  Epigastric pain and GERD      HPI 29 y.o. white female female last seen approximately 1 month ago in emergency room for complaint of epigastric pain.  Patient state she was taking Protonix 40 mg daily for about 2 weeks and then she stopped and she has felt much better since.  Patient state she is no longer having epigastric pain or the reflux.  We did discuss what foods and beverages can calls her to have epigastric pain and GERD.  Patient verbalized understanding.  Discussed H pylori with patient and possibilities of having this infectious process.  Patient denies any hematochezia melena.  Denies any nausea vomiting or dysphagia  Labs reviewed hemoglobin 12.0 hematocrit 35.6 and no elevated liver enzymes.  Medical records reviewed. Additional history supplemented by nursing.     Past Medical History:   Diagnosis Date    39 weeks gestation of pregnancy 05/03/2021    Irritable bowel syndrome     Migraine headache     Pregnant and not yet delivered 03/16/2021    Rh negative state in antepartum period 05/03/2021         Past Surgical History:   Procedure Laterality Date    CYSTOSCOPY N/A 08/23/2023    Procedure: CYSTOSCOPY;  Surgeon: Bin Garland MD;  Location: [facility] OR;  Service: OB/GYN;  Laterality: N/A;    ROBOT-ASSISTED LAPAROSCOPIC ABDOMINAL HYSTERECTOMY USING DA MARY XI N/A 08/23/2023    Procedure: XI ROBOTIC HYSTERECTOMY;  Surgeon: Bin Garland MD;  Location: [facility] OR;  Service: OB/GYN;  Laterality: N/A;    tongue clipped      TUBAL LIGATION         Social History  Social History     Tobacco Use    Smoking status: Every Day     Types: Vaping with nicotine    Smokeless tobacco: Current   Substance Use Topics    Alcohol use: Never    Drug use: Never         Family History   Problem Relation Name Age of Onset    Hypertension Paternal Grandfather Ministerio Vazquez     Stroke Paternal Grandfather Ministerio Vazquez     Hypertension Father Regan George     Stroke Father Regan Vazquez     Breast cancer Neg Hx  "     Colon cancer Neg Hx      Miscarriages / Stillbirths Neg Hx         Review of Systems  General ROS: negative for chills, fever or weight loss  Psychological ROS: negative for hallucination, depression or suicidal ideation  Ophthalmic ROS: negative for blurry vision, photophobia or eye pain  ENT ROS: negative for epistaxis, sore throat or rhinorrhea  Respiratory ROS: no cough, shortness of breath, or wheezing  Cardiovascular ROS: no chest pain or dyspnea on exertion  Gastrointestinal ROS: no abdominal pain, change in bowel habits, or black/ bloody stools    Physical Examination  BP (!) 99/36   Pulse 70   Ht 5' 3" (1.6 m)   Wt 53.4 kg (117 lb 12.8 oz)   LMP  (LMP Unknown)   SpO2 98%   BMI 20.87 kg/m²   General appearance: alert, cooperative, no distress  HENT: Normocephalic, atraumatic, neck symmetrical, no nasal discharge   Eyes: conjunctivae/corneas clear, PERRL, EOM's intact  Lungs: no labored breathing, symmetric chest wall expansion bilaterally  Heart: regular rate and rhythm without rub; no displacement of the PMI   Abdomen: soft, non-tender; bowel sounds normoactive; no organomegaly    Labs:  Lab Results   Component Value Date    WBC 4.67 10/29/2024    HGB 12.0 10/29/2024    HCT 35.6 (L) 10/29/2024    MCV 86.6 10/29/2024     10/29/2024       CMP  Sodium   Date Value Ref Range Status   10/29/2024 138 136 - 145 mmol/L Final     Potassium   Date Value Ref Range Status   10/29/2024 3.5 3.5 - 5.1 mmol/L Final     Chloride   Date Value Ref Range Status   10/29/2024 106 98 - 107 mmol/L Final     CO2   Date Value Ref Range Status   10/29/2024 22 21 - 32 mmol/L Final     Glucose   Date Value Ref Range Status   10/29/2024 97 74 - 106 mg/dL Final     BUN   Date Value Ref Range Status   10/29/2024 11 7 - 18 mg/dL Final     Creatinine   Date Value Ref Range Status   10/29/2024 0.61 0.55 - 1.02 mg/dL Final     Calcium   Date Value Ref Range Status   10/29/2024 8.6 8.5 - 10.1 mg/dL Final     Total Protein "   Date Value Ref Range Status   10/29/2024 7.0 6.4 - 8.2 g/dL Final     Albumin   Date Value Ref Range Status   10/29/2024 3.8 3.5 - 5.0 g/dL Final     Bilirubin, Total   Date Value Ref Range Status   10/29/2024 0.3 >0.0 - 1.2 mg/dL Final     Alk Phos   Date Value Ref Range Status   10/29/2024 55 37 - 98 U/L Final     AST   Date Value Ref Range Status   10/29/2024 25 15 - 37 U/L Final     ALT   Date Value Ref Range Status   10/29/2024 42 13 - 56 U/L Final     Anion Gap   Date Value Ref Range Status   10/29/2024 14 7 - 16 mmol/L Final     eGFR    Date Value Ref Range Status   02/18/2021 239       eGFR   Date Value Ref Range Status   02/17/2022 132 >=60 mL/min/1.73m² Final       Imaging:  US ABDOMEN COMPLETE     CLINICAL HISTORY:  Epigastric pain     TECHNIQUE:  Complete abdominal ultrasound (including pancreas, aorta, liver, gallbladder, common bile duct, IVC, kidneys, and spleen) was performed.     COMPARISON:  None     FINDINGS:  The liver is normal in size and echogenicity measuring 15.1 cm.  The portal vein is identified and patent demonstrating normal flow by color and power Doppler.  No perihepatic fluid.     The gallbladder is normally distended without cholelithiasis.  No gallbladder wall thickening or pericholecystic fluid.  Negative sonographic Pagan's sign.  Common bile duct is normal measuring 0.34 cm.     The visualized pancreas is normal in size and echogenicity.     Kidneys are normal in size and echogenicity measuring 10.5 cm on the right and 10.2 cm on left.  Both kidneys demonstrate normal flow by color Doppler.  No changes of hydronephrosis.     The spleen is normal in size and echogenicity measuring 10.0 cm.     Abdominal aorta and IVC identified.     No ascites identified.     Impression:     No significant ultrasound abnormality.        Electronically signed by:Jonathan Hernandez  Date:                                            10/29/2024    Independently reviewed    Assessment:   Martha Vazquez 30 yo WF here with:  Epigastric pain  GERD    Plan:   Avoid spicy, greasy foods  Avoid caffeine, citric acid, chocolate, peppermint, and carbonated drinks  Do not lay down within 3 hours of eating  Exercise 150 minutes per week  Increase fluid to 64 ounces daily  Avoid antiinflammatory medications such as motrin, advil, aleve, ibuprofen, and BC powder   Follow-up as needed    -Over 45 minutes total face to face time and >50% spent in counseling and coordination of care with documented content discussed   Carla Adams, FNP [facility]

## 2024-12-09 ENCOUNTER — TELEPHONE (OUTPATIENT)
Dept: GASTROENTEROLOGY | Facility: CLINIC | Age: 29
End: 2024-12-09
Payer: MEDICAID

## 2024-12-09 NOTE — TELEPHONE ENCOUNTER
Attempted to contact pt w/o success - VM left - results has been viewed per pt through portal - pt advised to call clinic with any questions

## 2024-12-09 NOTE — TELEPHONE ENCOUNTER
----- Message from Priti Hollingsworth NP sent at 12/5/2024  4:54 PM CST -----  H pylori is negative

## 2025-02-05 ENCOUNTER — OFFICE VISIT (OUTPATIENT)
Dept: NEUROLOGY | Facility: CLINIC | Age: 30
End: 2025-02-05
Payer: MEDICAID

## 2025-02-05 VITALS
OXYGEN SATURATION: 100 % | DIASTOLIC BLOOD PRESSURE: 60 MMHG | HEART RATE: 68 BPM | BODY MASS INDEX: 20.38 KG/M2 | RESPIRATION RATE: 18 BRPM | SYSTOLIC BLOOD PRESSURE: 98 MMHG | HEIGHT: 63 IN | WEIGHT: 115 LBS

## 2025-02-05 DIAGNOSIS — G43.009 MIGRAINE WITHOUT AURA AND WITHOUT STATUS MIGRAINOSUS, NOT INTRACTABLE: Primary | ICD-10-CM

## 2025-02-05 PROCEDURE — 99214 OFFICE O/P EST MOD 30 MIN: CPT | Mod: S$PBB,,, | Performed by: PSYCHIATRY & NEUROLOGY

## 2025-02-05 PROCEDURE — 99999 PR PBB SHADOW E&M-EST. PATIENT-LVL IV: CPT | Mod: PBBFAC,,, | Performed by: PSYCHIATRY & NEUROLOGY

## 2025-02-05 PROCEDURE — 3078F DIAST BP <80 MM HG: CPT | Mod: ,,, | Performed by: PSYCHIATRY & NEUROLOGY

## 2025-02-05 PROCEDURE — 99214 OFFICE O/P EST MOD 30 MIN: CPT | Mod: PBBFAC | Performed by: PSYCHIATRY & NEUROLOGY

## 2025-02-05 PROCEDURE — 3074F SYST BP LT 130 MM HG: CPT | Mod: ,,, | Performed by: PSYCHIATRY & NEUROLOGY

## 2025-02-05 PROCEDURE — 3008F BODY MASS INDEX DOCD: CPT | Mod: ,,, | Performed by: PSYCHIATRY & NEUROLOGY

## 2025-02-05 RX ORDER — TOPIRAMATE 100 MG/1
100 TABLET, FILM COATED ORAL 2 TIMES DAILY
Qty: 180 TABLET | Refills: 3 | Status: SHIPPED | OUTPATIENT
Start: 2025-02-05

## 2025-02-05 NOTE — PROGRESS NOTES
Subjective:       Patient ID: Martha Vazquez is a 29 y.o. female     Chief Complaint:    Chief Complaint   Patient presents with    migraine without Aura     Pt. States migraines are better.        Allergies:  Morphine, Lortab [hydrocodone-acetaminophen], and Penicillins    Current Medications:    Outpatient Encounter Medications as of 2/5/2025   Medication Sig Dispense Refill    rizatriptan (MAXALT) 10 MG tablet       [DISCONTINUED] topiramate (TOPAMAX) 100 MG tablet Take 1 tablet (100 mg total) by mouth 2 (two) times daily. 180 tablet 3    topiramate (TOPAMAX) 100 MG tablet Take 1 tablet (100 mg total) by mouth 2 (two) times daily. 180 tablet 3    [DISCONTINUED] topiramate (TOPAMAX) 50 MG tablet Take 2 tablets twice a day by oral route for 30 days. (Patient not taking: Reported on 2/5/2025)       No facility-administered encounter medications on file as of 2/5/2025.       History of Present Illness  30 yo WF in 6 month f/u for migraines- have been well controlled on current TOPAMAX bid - uses maxalt prn   Cannot recall last migraine she has had?  Now under some stressors and stopped vaping          Past Medical History:   Diagnosis Date    39 weeks gestation of pregnancy 05/03/2021    Irritable bowel syndrome     Migraine headache     Pregnant and not yet delivered 03/16/2021    Rh negative state in antepartum period 05/03/2021       Past Surgical History:   Procedure Laterality Date    CYSTOSCOPY N/A 08/23/2023    Procedure: CYSTOSCOPY;  Surgeon: Bin Garland MD;  Location: [facility] OR;  Service: OB/GYN;  Laterality: N/A;    ROBOT-ASSISTED LAPAROSCOPIC ABDOMINAL HYSTERECTOMY USING DA MARY XI N/A 08/23/2023    Procedure: XI ROBOTIC HYSTERECTOMY;  Surgeon: Bin Garland MD;  Location: [facility] OR;  Service: OB/GYN;  Laterality: N/A;    tongue clipped      TUBAL LIGATION         Social History  Ms. Vazquez  reports that she has quit smoking. Her smoking use included vaping with nicotine. She has quit using  "smokeless tobacco. She reports that she does not drink alcohol and does not use drugs.    Family History  Ms.'s Vazquez family history includes Hypertension in her father and paternal grandfather; Stroke in her father and paternal grandfather.    Review of Systems  Review of Systems   Neurological:  Positive for headaches.   Psychiatric/Behavioral:  Positive for depression.    All other systems reviewed and are negative.     Objective:   BP 98/60 (BP Location: Right arm, Patient Position: Sitting)   Pulse 68   Resp 18   Ht 5' 3" (1.6 m)   Wt 52.2 kg (115 lb)   LMP  (LMP Unknown)   SpO2 100%   BMI 20.37 kg/m²    NEUROLOGICAL EXAMINATION:     MENTAL STATUS   Oriented to person, place, and time.   Knowledge: good.     CRANIAL NERVES   Cranial nerves II through XII intact.     MOTOR EXAM     Strength   Strength 5/5 throughout.     GAIT AND COORDINATION     Gait  Gait: normal       Physical Exam  Vitals reviewed.   Constitutional:       Appearance: She is normal weight.   Neurological:      General: No focal deficit present.      Mental Status: She is alert and oriented to person, place, and time. Mental status is at baseline.      Cranial Nerves: Cranial nerves 2-12 are intact.      Motor: Motor strength is normal.     Gait: Gait is intact.          Assessment:     Migraine without aura and without status migrainosus, not intractable    Other orders  -     topiramate (TOPAMAX) 100 MG tablet; Take 1 tablet (100 mg total) by mouth 2 (two) times daily.  Dispense: 180 tablet; Refill: 3         Primary Diagnosis and ICD10  Migraine without aura and without status migrainosus, not intractable [G43.009]    Plan:     Patient Instructions   Cont Topamax 100mg twice daily  Cont no vaping   Stress/anxiety reduction  F/u 6 months     Medications Discontinued During This Encounter   Medication Reason    topiramate (TOPAMAX) 50 MG tablet     topiramate (TOPAMAX) 100 MG tablet Reorder       Requested Prescriptions     Signed " Prescriptions Disp Refills    topiramate (TOPAMAX) 100 MG tablet 180 tablet 3     Sig: Take 1 tablet (100 mg total) by mouth 2 (two) times daily.

## 2025-02-20 ENCOUNTER — TELEPHONE (OUTPATIENT)
Dept: NEUROLOGY | Facility: CLINIC | Age: 30
End: 2025-02-20
Payer: MEDICAID

## 2025-02-20 RX ORDER — RIZATRIPTAN BENZOATE 10 MG/1
10 TABLET ORAL DAILY PRN
Qty: 27 TABLET | Refills: 3 | Status: SHIPPED | OUTPATIENT
Start: 2025-02-20

## 2025-03-22 ENCOUNTER — OFFICE VISIT (OUTPATIENT)
Dept: FAMILY MEDICINE | Facility: CLINIC | Age: 30
End: 2025-03-22
Payer: MEDICAID

## 2025-03-22 VITALS
DIASTOLIC BLOOD PRESSURE: 64 MMHG | HEART RATE: 63 BPM | HEIGHT: 63 IN | RESPIRATION RATE: 20 BRPM | SYSTOLIC BLOOD PRESSURE: 123 MMHG | BODY MASS INDEX: 21.26 KG/M2 | WEIGHT: 120 LBS | TEMPERATURE: 98 F | OXYGEN SATURATION: 98 %

## 2025-03-22 DIAGNOSIS — N89.8 VAGINAL IRRITATION: ICD-10-CM

## 2025-03-22 DIAGNOSIS — N76.0 ACUTE VAGINITIS: Primary | ICD-10-CM

## 2025-03-22 DIAGNOSIS — R30.0 DYSURIA: ICD-10-CM

## 2025-03-22 PROBLEM — K58.1 IRRITABLE BOWEL SYNDROME WITH CONSTIPATION: Status: ACTIVE | Noted: 2024-10-30

## 2025-03-22 LAB
BACTERIAL VAGINOSIS DNA (OHS): NEGATIVE
BILIRUB SERPL-MCNC: NEGATIVE MG/DL
BLOOD URINE, POC: NEGATIVE
CANDIDA GLABRATA/KRUSEI DNA (OHS): NOT DETECTED
CANDIDA SPECIES DNA (OHS): DETECTED
CLARITY, UA: CLEAR
COLOR, UA: YELLOW
GLUCOSE UR QL STRIP: NEGATIVE
KETONES UR QL STRIP: NEGATIVE
LEUKOCYTE ESTERASE URINE, POC: NEGATIVE
NITRITE, POC UA: NEGATIVE
PH, POC UA: 6.5
PROTEIN, POC: NEGATIVE
SPECIFIC GRAVITY, POC UA: 1.03
TRICHOMONAS VAGINALIS DNA (OHS): NOT DETECTED
UROBILINOGEN, POC UA: 0.2

## 2025-03-22 PROCEDURE — 99203 OFFICE O/P NEW LOW 30 MIN: CPT | Mod: ,,, | Performed by: NURSE PRACTITIONER

## 2025-03-22 PROCEDURE — 81515 NFCT DS BV&VAGINITIS DNA ALG: CPT | Mod: QW,,, | Performed by: CLINICAL MEDICAL LABORATORY

## 2025-03-22 PROCEDURE — 3078F DIAST BP <80 MM HG: CPT | Mod: ,,, | Performed by: NURSE PRACTITIONER

## 2025-03-22 PROCEDURE — 3074F SYST BP LT 130 MM HG: CPT | Mod: ,,, | Performed by: NURSE PRACTITIONER

## 2025-03-22 PROCEDURE — 99051 MED SERV EVE/WKEND/HOLIDAY: CPT | Mod: ,,, | Performed by: NURSE PRACTITIONER

## 2025-03-22 PROCEDURE — 3008F BODY MASS INDEX DOCD: CPT | Mod: ,,, | Performed by: NURSE PRACTITIONER

## 2025-03-22 RX ORDER — TERCONAZOLE 4 MG/G
1 CREAM VAGINAL NIGHTLY
Qty: 45 G | Refills: 0 | Status: SHIPPED | OUTPATIENT
Start: 2025-03-22

## 2025-03-22 RX ORDER — FLUCONAZOLE 150 MG/1
150 TABLET ORAL
Qty: 2 TABLET | Refills: 0 | Status: SHIPPED | OUTPATIENT
Start: 2025-03-22 | End: 2025-03-30

## 2025-03-22 NOTE — PROGRESS NOTES
Subjective:       Patient ID: Martha Vazquez is a 29 y.o. female.    Chief Complaint: Vaginal irritation and Dysuria    Vaginal irritation and Dysuria      Dysuria   Pertinent negatives include no chills, flank pain, frequency, nausea, urgency, vomiting or rash.     Review of Systems   Constitutional:  Negative for appetite change, chills, fatigue and fever.   HENT:  Negative for nasal congestion, ear pain and sore throat.    Eyes:  Negative for pain, discharge and itching.   Respiratory:  Negative for cough and shortness of breath.    Cardiovascular:  Negative for chest pain and leg swelling.   Gastrointestinal:  Negative for abdominal pain, change in bowel habit, nausea and vomiting.   Genitourinary:  Positive for dysuria, vaginal discharge and vaginal pain. Negative for flank pain, frequency, genital sores, menstrual irregularity, menstrual problem, pelvic pain and urgency.   Musculoskeletal:  Negative for back pain, gait problem and neck pain.   Integumentary:  Negative for rash and wound.   Allergic/Immunologic: Negative for immunocompromised state.   Neurological:  Negative for dizziness, weakness and headaches.   All other systems reviewed and are negative.        Objective:      Physical Exam  Constitutional:       General: She is not in acute distress.     Appearance: Normal appearance. She is not ill-appearing, toxic-appearing or diaphoretic.   Eyes:      Pupils: Pupils are equal, round, and reactive to light.   Cardiovascular:      Rate and Rhythm: Normal rate and regular rhythm.      Pulses: Normal pulses.      Heart sounds: Normal heart sounds.   Pulmonary:      Effort: Pulmonary effort is normal.      Breath sounds: Normal breath sounds.   Abdominal:      General: Bowel sounds are normal.      Palpations: Abdomen is soft.      Tenderness: There is no abdominal tenderness. There is no right CVA tenderness, left CVA tenderness, guarding or rebound.   Genitourinary:     Vagina: Vaginal discharge present.    Musculoskeletal:         General: Normal range of motion.      Cervical back: Normal range of motion.   Skin:     General: Skin is warm and dry.      Findings: No lesion or rash.   Neurological:      Mental Status: She is alert and oriented to person, place, and time.   Psychiatric:         Mood and Affect: Mood normal.         Behavior: Behavior normal.         Thought Content: Thought content normal.         Judgment: Judgment normal.            Assessment:       1. Acute vaginitis    2. Vaginal irritation    3. Dysuria        Plan:   Acute vaginitis  -     terconazole (TERAZOL 7) 0.4 % Crea; Place 1 applicator vaginally every evening.  Dispense: 45 g; Refill: 0  -     fluconazole (DIFLUCAN) 150 MG Tab; Take 1 tablet (150 mg total) by mouth every 7 days. for 2 doses  Dispense: 2 tablet; Refill: 0    Vaginal irritation  -     Vaginosis Screen by DNA Probe; Future; Expected date: 03/22/2025    Dysuria  -     POCT URINALYSIS W/O SCOPE           Risks, benefits, and side effects were discussed with the patient. All questions were answered to the fullest satisfaction of the patient, and pt verbalized understanding and agreement to treatment plan. Pt was to call with any new or worsening symptoms, or present to the ER

## 2025-08-12 ENCOUNTER — OFFICE VISIT (OUTPATIENT)
Dept: NEUROLOGY | Facility: CLINIC | Age: 30
End: 2025-08-12
Payer: MEDICAID

## 2025-08-12 VITALS
HEIGHT: 63 IN | DIASTOLIC BLOOD PRESSURE: 60 MMHG | SYSTOLIC BLOOD PRESSURE: 100 MMHG | WEIGHT: 120 LBS | HEART RATE: 55 BPM | BODY MASS INDEX: 21.26 KG/M2 | OXYGEN SATURATION: 98 % | RESPIRATION RATE: 18 BRPM

## 2025-08-12 DIAGNOSIS — G43.009 MIGRAINE WITHOUT AURA AND WITHOUT STATUS MIGRAINOSUS, NOT INTRACTABLE: Primary | ICD-10-CM

## 2025-08-12 PROCEDURE — 3008F BODY MASS INDEX DOCD: CPT | Mod: ,,, | Performed by: PSYCHIATRY & NEUROLOGY

## 2025-08-12 PROCEDURE — 99214 OFFICE O/P EST MOD 30 MIN: CPT | Mod: S$PBB,,, | Performed by: PSYCHIATRY & NEUROLOGY

## 2025-08-12 PROCEDURE — 3078F DIAST BP <80 MM HG: CPT | Mod: ,,, | Performed by: PSYCHIATRY & NEUROLOGY

## 2025-08-12 PROCEDURE — 99999 PR PBB SHADOW E&M-EST. PATIENT-LVL IV: CPT | Mod: PBBFAC,,, | Performed by: PSYCHIATRY & NEUROLOGY

## 2025-08-12 PROCEDURE — 99214 OFFICE O/P EST MOD 30 MIN: CPT | Mod: PBBFAC | Performed by: PSYCHIATRY & NEUROLOGY

## 2025-08-12 PROCEDURE — 3074F SYST BP LT 130 MM HG: CPT | Mod: ,,, | Performed by: PSYCHIATRY & NEUROLOGY

## 2025-08-12 RX ORDER — TOPIRAMATE 100 MG/1
100 TABLET, FILM COATED ORAL 2 TIMES DAILY
Qty: 180 TABLET | Refills: 3 | Status: SHIPPED | OUTPATIENT
Start: 2025-08-12

## 2025-08-12 RX ORDER — RIZATRIPTAN BENZOATE 10 MG/1
10 TABLET ORAL DAILY PRN
Qty: 27 TABLET | Refills: 3 | Status: SHIPPED | OUTPATIENT
Start: 2025-08-12

## (undated) DEVICE — TAPE DRAPE STRIP CLSR 4.5X24IN

## (undated) DEVICE — KIT ROBOTIC RUSH

## (undated) DEVICE — SUT VICRYL 0 CT-2 27 DYE

## (undated) DEVICE — SEALER VESSEL EXTEND

## (undated) DEVICE — SOL NACL IRR 3000ML

## (undated) DEVICE — EVACUATOR KIT SMOKE PLUME AWAY

## (undated) DEVICE — SEAL UNIVERSAL 5MM-8MM XI

## (undated) DEVICE — GOWN NONREINF SET-IN SLV 2XL

## (undated) DEVICE — TIP RUMI BLUE DISPOSABLE 5/BX

## (undated) DEVICE — SOL NACL IRR 1000ML BTL

## (undated) DEVICE — GLOVE PROTEXIS PI SYN SURG 7.5

## (undated) DEVICE — SUT V-LOC GRN 30CM 12IN 2-0

## (undated) DEVICE — SUT 4-0 VICRYL / FS-2

## (undated) DEVICE — GLOVE PROTEXIS PI SYN SURG 6.5

## (undated) DEVICE — COVER PROXIMA MAYO STAND

## (undated) DEVICE — TUBING INSUFFLATION HEATED

## (undated) DEVICE — SUT VICRYL PLUS 1 CTX 36IN

## (undated) DEVICE — NDL DRIVER SUTURECUT MEGA XI

## (undated) DEVICE — DRAPE ARM DAVINCI XI

## (undated) DEVICE — WARMER BLUE HEAT SCOPE 3-12MM

## (undated) DEVICE — SET CYSTO IRR DRP CHMBR 84IN

## (undated) DEVICE — SCISSOR HOT SHEARS XI

## (undated) DEVICE — FORCEP FENESTRATED BIPOLAR

## (undated) DEVICE — COVER TIP CURVED SCISSORS XI

## (undated) DEVICE — OBTURATOR BLADELESS 8MM XI CLR

## (undated) DEVICE — TROCAR ENDO Z THREAD KII 5X100

## (undated) DEVICE — OCCLUDER COLPO-PNEUMO STERILE

## (undated) DEVICE — IRRIGATOR ENDOSCOPY DISP.